# Patient Record
Sex: MALE | Race: ASIAN | NOT HISPANIC OR LATINO | URBAN - METROPOLITAN AREA
[De-identification: names, ages, dates, MRNs, and addresses within clinical notes are randomized per-mention and may not be internally consistent; named-entity substitution may affect disease eponyms.]

---

## 2017-03-03 ENCOUNTER — INPATIENT (INPATIENT)
Facility: HOSPITAL | Age: 62
LOS: 2 days | Discharge: HOME | End: 2017-03-06
Attending: INTERNAL MEDICINE | Admitting: INTERNAL MEDICINE
Payer: COMMERCIAL

## 2017-03-03 VITALS
DIASTOLIC BLOOD PRESSURE: 88 MMHG | HEIGHT: 67 IN | TEMPERATURE: 98 F | HEART RATE: 55 BPM | SYSTOLIC BLOOD PRESSURE: 162 MMHG | OXYGEN SATURATION: 99 % | WEIGHT: 199.96 LBS | RESPIRATION RATE: 20 BRPM

## 2017-03-03 DIAGNOSIS — R06.02 SHORTNESS OF BREATH: ICD-10-CM

## 2017-03-03 DIAGNOSIS — D16.9 BENIGN NEOPLASM OF BONE AND ARTICULAR CARTILAGE, UNSPECIFIED: Chronic | ICD-10-CM

## 2017-03-03 DIAGNOSIS — J18.9 PNEUMONIA, UNSPECIFIED ORGANISM: ICD-10-CM

## 2017-03-03 LAB
ALBUMIN SERPL ELPH-MCNC: 3.1 G/DL — LOW (ref 3.3–5)
ALP SERPL-CCNC: 63 U/L — SIGNIFICANT CHANGE UP (ref 40–120)
ALT FLD-CCNC: 46 U/L — SIGNIFICANT CHANGE UP (ref 12–78)
ANION GAP SERPL CALC-SCNC: 10 MMOL/L — SIGNIFICANT CHANGE UP (ref 5–17)
AST SERPL-CCNC: 42 U/L — HIGH (ref 15–37)
BASE EXCESS BLDV CALC-SCNC: 1.3 MMOL/L — SIGNIFICANT CHANGE UP (ref -2–2)
BASOPHILS # BLD AUTO: 0.1 K/UL — SIGNIFICANT CHANGE UP (ref 0–0.2)
BASOPHILS NFR BLD AUTO: 1.3 % — SIGNIFICANT CHANGE UP (ref 0–2)
BILIRUB SERPL-MCNC: 0.7 MG/DL — SIGNIFICANT CHANGE UP (ref 0.2–1.2)
BLOOD GAS COMMENTS, VENOUS: SIGNIFICANT CHANGE UP
BUN SERPL-MCNC: 25 MG/DL — HIGH (ref 7–23)
CALCIUM SERPL-MCNC: 7.6 MG/DL — LOW (ref 8.5–10.1)
CHLORIDE SERPL-SCNC: 104 MMOL/L — SIGNIFICANT CHANGE UP (ref 96–108)
CO2 SERPL-SCNC: 28 MMOL/L — SIGNIFICANT CHANGE UP (ref 22–31)
CREAT SERPL-MCNC: 1.41 MG/DL — HIGH (ref 0.5–1.3)
EOSINOPHIL # BLD AUTO: 0.1 K/UL — SIGNIFICANT CHANGE UP (ref 0–0.5)
EOSINOPHIL NFR BLD AUTO: 1.2 % — SIGNIFICANT CHANGE UP (ref 0–6)
FLUAV SPEC QL CULT: NEGATIVE — SIGNIFICANT CHANGE UP
FLUBV AG SPEC QL IA: NEGATIVE — SIGNIFICANT CHANGE UP
GAS PNL BLDV: SIGNIFICANT CHANGE UP
GLUCOSE SERPL-MCNC: 114 MG/DL — HIGH (ref 70–99)
HCO3 BLDV-SCNC: 25 MMOL/L — SIGNIFICANT CHANGE UP (ref 21–29)
HCT VFR BLD CALC: 42.4 % — SIGNIFICANT CHANGE UP (ref 39–50)
HGB BLD-MCNC: 14.4 G/DL — SIGNIFICANT CHANGE UP (ref 13–17)
HOROWITZ INDEX BLDV+IHG-RTO: 28 — SIGNIFICANT CHANGE UP
LYMPHOCYTES # BLD AUTO: 0.8 K/UL — LOW (ref 1–3.3)
LYMPHOCYTES # BLD AUTO: 9.5 % — LOW (ref 13–44)
MCHC RBC-ENTMCNC: 31.4 PG — SIGNIFICANT CHANGE UP (ref 27–34)
MCHC RBC-ENTMCNC: 33.9 GM/DL — SIGNIFICANT CHANGE UP (ref 32–36)
MCV RBC AUTO: 92.5 FL — SIGNIFICANT CHANGE UP (ref 80–100)
MONOCYTES # BLD AUTO: 0.7 K/UL — SIGNIFICANT CHANGE UP (ref 0–0.9)
MONOCYTES NFR BLD AUTO: 7.9 % — SIGNIFICANT CHANGE UP (ref 2–14)
NEUTROPHILS # BLD AUTO: 7.2 K/UL — SIGNIFICANT CHANGE UP (ref 1.8–7.4)
NEUTROPHILS NFR BLD AUTO: 80.2 % — HIGH (ref 43–77)
NT-PROBNP SERPL-SCNC: HIGH PG/ML (ref 0–125)
PCO2 BLDV: 39 MMHG — SIGNIFICANT CHANGE UP (ref 35–50)
PH BLDV: 7.42 — SIGNIFICANT CHANGE UP (ref 7.35–7.45)
PLATELET # BLD AUTO: 250 K/UL — SIGNIFICANT CHANGE UP (ref 150–400)
PO2 BLDV: 121 MMHG — HIGH (ref 25–45)
POTASSIUM SERPL-MCNC: 3.8 MMOL/L — SIGNIFICANT CHANGE UP (ref 3.5–5.3)
POTASSIUM SERPL-SCNC: 3.8 MMOL/L — SIGNIFICANT CHANGE UP (ref 3.5–5.3)
PROT SERPL-MCNC: 6.4 GM/DL — SIGNIFICANT CHANGE UP (ref 6–8.3)
RBC # BLD: 4.58 M/UL — SIGNIFICANT CHANGE UP (ref 4.2–5.8)
RBC # FLD: 13.8 % — SIGNIFICANT CHANGE UP (ref 11–15)
SAO2 % BLDV: 99 % — HIGH (ref 67–88)
SODIUM SERPL-SCNC: 142 MMOL/L — SIGNIFICANT CHANGE UP (ref 135–145)
TROPONIN I SERPL-MCNC: 0.12 NG/ML — HIGH (ref 0.01–0.04)
WBC # BLD: 9 K/UL — SIGNIFICANT CHANGE UP (ref 3.8–10.5)
WBC # FLD AUTO: 9 K/UL — SIGNIFICANT CHANGE UP (ref 3.8–10.5)

## 2017-03-03 PROCEDURE — 71010: CPT | Mod: 26

## 2017-03-03 PROCEDURE — 99283 EMERGENCY DEPT VISIT LOW MDM: CPT

## 2017-03-03 RX ORDER — FUROSEMIDE 40 MG
40 TABLET ORAL ONCE
Qty: 0 | Refills: 0 | Status: COMPLETED | OUTPATIENT
Start: 2017-03-03 | End: 2017-03-03

## 2017-03-03 RX ORDER — ASPIRIN/CALCIUM CARB/MAGNESIUM 324 MG
325 TABLET ORAL ONCE
Qty: 0 | Refills: 0 | Status: COMPLETED | OUTPATIENT
Start: 2017-03-03 | End: 2017-03-03

## 2017-03-03 RX ORDER — MAGNESIUM SULFATE 500 MG/ML
2 VIAL (ML) INJECTION ONCE
Qty: 0 | Refills: 0 | Status: COMPLETED | OUTPATIENT
Start: 2017-03-03 | End: 2017-03-03

## 2017-03-03 RX ORDER — SODIUM CHLORIDE 9 MG/ML
1000 INJECTION, SOLUTION INTRAVENOUS
Qty: 0 | Refills: 0 | Status: DISCONTINUED | OUTPATIENT
Start: 2017-03-03 | End: 2017-03-05

## 2017-03-03 RX ORDER — ENOXAPARIN SODIUM 100 MG/ML
40 INJECTION SUBCUTANEOUS EVERY 24 HOURS
Qty: 0 | Refills: 0 | Status: DISCONTINUED | OUTPATIENT
Start: 2017-03-03 | End: 2017-03-06

## 2017-03-03 RX ORDER — FUROSEMIDE 40 MG
40 TABLET ORAL DAILY
Qty: 0 | Refills: 0 | Status: DISCONTINUED | OUTPATIENT
Start: 2017-03-03 | End: 2017-03-06

## 2017-03-03 RX ORDER — IPRATROPIUM/ALBUTEROL SULFATE 18-103MCG
3 AEROSOL WITH ADAPTER (GRAM) INHALATION
Qty: 0 | Refills: 0 | Status: COMPLETED | OUTPATIENT
Start: 2017-03-03 | End: 2017-03-03

## 2017-03-03 RX ORDER — SPIRONOLACTONE 25 MG/1
25 TABLET, FILM COATED ORAL DAILY
Qty: 0 | Refills: 0 | Status: DISCONTINUED | OUTPATIENT
Start: 2017-03-03 | End: 2017-03-06

## 2017-03-03 RX ORDER — ALBUTEROL 90 UG/1
2.5 AEROSOL, METERED ORAL ONCE
Qty: 0 | Refills: 0 | Status: COMPLETED | OUTPATIENT
Start: 2017-03-03 | End: 2017-03-03

## 2017-03-03 RX ADMIN — Medication 3 MILLILITER(S): at 14:56

## 2017-03-03 RX ADMIN — Medication 3 MILLILITER(S): at 15:02

## 2017-03-03 RX ADMIN — ALBUTEROL 2.5 MILLIGRAM(S): 90 AEROSOL, METERED ORAL at 17:24

## 2017-03-03 RX ADMIN — Medication 3 MILLILITER(S): at 15:13

## 2017-03-03 RX ADMIN — Medication 100 GRAM(S): at 15:10

## 2017-03-03 RX ADMIN — Medication 325 MILLIGRAM(S): at 16:23

## 2017-03-03 RX ADMIN — Medication 40 MILLIGRAM(S): at 17:21

## 2017-03-03 NOTE — ED ADULT NURSE NOTE - OBJECTIVE STATEMENT
Patient complaining of shortness of breath for the past week and states he has been "dragging." Bilateral legs noted to be swollen at this time.

## 2017-03-03 NOTE — CONSULT NOTE ADULT - SUBJECTIVE AND OBJECTIVE BOX
HPI:  62 y/o M w hx of htn, asthma, CHF w EF 40%, presents w shortness of breath and chest tightness x 1 week; reports cough productive of yellow/bebe sputum; denies fever or chills; denies chest pain; denies lower ext swelling. on diuretics and has'nt has them for about one week      PAST MEDICAL & SURGICAL HISTORY:  chf with dilated left ventricle and  EF of 45%. also hx of asthma-like symptoms. s/p cva    FAMILY HISTORY:    SOCIAL HISTORY: BMI (kg/m2): 31.3 (03-03 @ 14:36)    Allergies  No Known Allergies          MEDICATIONS  (STANDING):  enoxaparin Injectable 40milliGRAM(s) SubCutaneous every 24 hours  sodium chloride 0.45%. 1000milliLiter(s) IV Continuous <Continuous>  levoFLOXacin IVPB 500milliGRAM(s) IV Intermittent every 24 hours  enalapril 5milliGRAM(s) Oral two times a day  furosemide    Tablet 40milliGRAM(s) Oral daily  spironolactone 25milliGRAM(s) Oral daily    MEDICATIONS  (PRN):    REVIEW OF SYSTEMS:    Constitutional:            No fever, weight loss or fatigue  HEENT:                         No difficulty hearing, tinnitus, vertigo; No sinus or throat pain  Respiratory:               cough, sob, wheezing  Cardiovascular:           No chest pain, palpitations  Gastrointestinal:        No abdominal or epigastric pain. No N/V/diarrhea or hematemesis  Genitourinary:            No dysuria, frequency, hematuria or incontinence  SKIN:                             no rash  Musculoskeletal:        No joint pain or swelling  Extremities:                occ. lower leg edema  Neurological:              No headaches  Psychiatric:                 No depression, anxiety    PQRS:  Vaccines - Influenza and Pneumovax:  BMI:  Tobacco:  Depression:   Colorectal Screening:  Breast Cancer Screening:  Blood Presssure Screening / Control of:  HbAIc:  Ischemic Vascular Disease:  Current Medications Reviewed:    Vital Signs Last 24 Hrs  T(C): 37, Max: 37 (03-03 @ 17:19)  T(F): 98.6, Max: 98.6 (03-03 @ 17:19)  HR: 109 (55 - 109)  BP: 172/83 (162/88 - 172/83)  BP(mean): --  RR: 24 (20 - 24)  SpO2: 98% (98% - 99%)    PHYSICAL EXAM:  GEN:         Awake, responsive and comfortable.  HEENT:    Normal.    RESP:     diminished breath sounds with rhonchi  CVS:             Regular rate and rhythm.   ABD:         Soft, non-tender, non-distended;   :             No costovertebral angle tenderness  SKIN:           Warm and dry.  EXTR:            mild lower ext. edema  CNS:              Intact sensory and motor function.  PSYCH:        cooperative, no anxiety or depression            LABS:                        14.4   9.0   )-----------( 250      ( 03 Mar 2017 15:22 )             42.4     03 Mar 2017 15:22    142    |  104    |  25     ----------------------------<  114    3.8     |  28     |  1.41     Ca    7.6        03 Mar 2017 15:22    TPro  6.4    /  Alb  3.1    /  TBili  0.7    /  DBili  x      /  AST  42     /  ALT  46     /  AlkPhos  63     03 Mar 2017 15:22                EKG:     RADIOLOGY & ADDITIONAL STUDIES:  PROCEDURE DATE:  03/03/2017        INTERPRETATION:  EXAMINATION DATE: March 3, 2017 at 1636 hours.  CLINICAL INFORMATION: Shortness of breath.    TECHNIQUE: Frontal view of the chest   COMPARISON:None.    FINDINGS:    LUNGS/PLEURA: Small left pleural effusion with basilar atelectasis or   pneumonia.  MEDIASTINUM: Cardiac silhouette is enlarged.  OTHER: None.    IMPRESSION:     Small left pleural effusion with basilar atelectasis or pneumonia.              YASHIRA IVY M.D., ATTENDING RADIOLOGIST  This document has been electronically signed. Mar  3 2017  5:03PM  ASSESSMENT AND PLAN: dilated cardiomyopathy, chf, obstructive airway disease, left pleural effusion and/or pneumonia. to continue diuretics, iv abs, and bronchdilators. steroids not required
HPI:  HPI:  62 y/o M w hx of htn, asthma, CHF w EF 40%, presents w shortness of breath and chest tightness x 1 week; reports cough productive of yellow/bebe sputum; denies fever or chills; denies chest pain; denies lower ext swelling; reports symptoms are c/w asthma exacerbation; (03 Mar 2017 19:51)      Chief Complaint:  Patient is a 61y old  Male who presents with a chief complaint of cough (03 Mar 2017 19:51)      Review of Systems:      ENT:  No sore throat, pain, runny nose, dysphagia  CV:  No pain, palpitations, hypo/hypertension  Resp:  SOB  GI:  No pain, nausea, vomiting, diarrhea, constipation  :  No pain, bleeding, incontinence, nocturia        Social History/Family History  SOCHX:   tobacco,  -  alcohol    FMHX: FA/MO  - contributory       Discussed with:  PMD, Family    Physical Exam:    Vital Signs:  Vital Signs Last 24 Hrs  T(C): 37.1, Max: 37.1 (03-04 @ 05:03)  T(F): 98.8, Max: 98.8 (03-04 @ 05:03)  HR: 94 (55 - 120)  BP: 133/85 (133/85 - 172/83)  BP(mean): --  RR: 18 (16 - 24)  SpO2: 98% (97% - 99%)  Daily Height in cm: 170.18 (03 Mar 2017 23:15)    Daily   I&O's Summary    I & Os for current day (as of 04 Mar 2017 10:01)  =============================================  IN: 0 ml / OUT: 600 ml / NET: -600 ml      Chest:  Full & symmetric excursion, no increased effort, breath sounds clear  Cardiovascular:  Regular rhythm, S1, S2, no murmur/rub/S3/S4, no carotid/femoral/abdominal bruit, radial/pedal pulses 2+, no edema  Abdomen:  Soft, non-tender, non-distended, normoactive bowel sounds, no HSM        Laboratory:                          14.2   8.8   )-----------( 226      ( 04 Mar 2017 06:51 )             40.6     04 Mar 2017 06:51    139    |  102    |  26     ----------------------------<  110    3.8     |  29     |  1.19     Ca    7.9        04 Mar 2017 06:51    TPro  6.4    /  Alb  3.1    /  TBili  0.7    /  DBili  x      /  AST  42     /  ALT  46     /  AlkPhos  63     03 Mar 2017 15:22      CARDIAC MARKERS ( 03 Mar 2017 15:22 )  .120 ng/mL / x     / x     / x     / x          CAPILLARY BLOOD GLUCOSE    LIVER FUNCTIONS - ( 03 Mar 2017 15:22 )  Alb: 3.1 g/dL / Pro: 6.4 gm/dL / ALK PHOS: 63 U/L / ALT: 46 U/L / AST: 42 U/L / GGT: x             Assessment:  SOB  H/O CHF, EF noted, acute on chronic systolic CHF  CAP  ABX initiated.   CHF management continues

## 2017-03-03 NOTE — ED PROVIDER NOTE - PHYSICAL EXAMINATION
Gen: well appearing, no respiratory distress  HEENT: Gen: well appearing, no respiratory distress  HEENT: no pallor;

## 2017-03-03 NOTE — ED PROVIDER NOTE - OBJECTIVE STATEMENT
Pertinent PMH/PSH/FHx/SHx and Review of Systems contained within:    60 y/o M w hx of htn, asthma, CHF w EF 40%, presents w shortness of breath and chest tightness x 1 week; reports cough productive of yellow/bebe sputum; denies fever or chills; denies chest pain; denies lower ext swelling; reports symptoms are c/w asthma exacerbation; ROS is otherwise negative  of note, patient received albuterol/atrovent and 125mg solumedrol via EMS prior to ED arrival    PMH: as above  meds: albuterol; spironolactone; furosemide; enalapril  allergies: nkda  SH: denies cig or drug use    EKG: RBBB TWI v1-v6

## 2017-03-03 NOTE — ED PROVIDER NOTE - MEDICAL DECISION MAKING DETAILS
A/P: 60 y/o M w hx of asthma, presents w progressive cough and chest tightness; also appears fluid overloaded on exam; plan for labs, ekg, CXR, and will likely need admission A/P: 60 y/o M w hx of asthma, presents w progressive cough and chest tightness; also appears fluid overloaded on exam; plan for labs, ekg, CXR, and will likely need admission  trop 0.120, BNP 10,000; will dose w asa/lasix; no chest pain; cardiology (Dr. Schumacher, awaiting call back); cxr significant for pna, will treat for CAP w levofloxacin; admit to Dr. Flores, pulmonary paged (Dr. Ramires)

## 2017-03-03 NOTE — H&P ADULT. - ASSESSMENT
60 y/o M w hx of htn, asthma, CHF w EF 40%, presents w shortness of breath and chest tightness x 1 week; reports cough productive of yellow/bebe sputum; denies fever or chills; denies chest pain; denies lower ext swelling; reports symptoms are c/w asthma exacerbation;

## 2017-03-04 LAB
ANION GAP SERPL CALC-SCNC: 8 MMOL/L — SIGNIFICANT CHANGE UP (ref 5–17)
BUN SERPL-MCNC: 26 MG/DL — HIGH (ref 7–23)
CALCIUM SERPL-MCNC: 7.9 MG/DL — LOW (ref 8.5–10.1)
CHLORIDE SERPL-SCNC: 102 MMOL/L — SIGNIFICANT CHANGE UP (ref 96–108)
CO2 SERPL-SCNC: 29 MMOL/L — SIGNIFICANT CHANGE UP (ref 22–31)
CREAT SERPL-MCNC: 1.19 MG/DL — SIGNIFICANT CHANGE UP (ref 0.5–1.3)
GLUCOSE SERPL-MCNC: 110 MG/DL — HIGH (ref 70–99)
HCT VFR BLD CALC: 40.6 % — SIGNIFICANT CHANGE UP (ref 39–50)
HGB BLD-MCNC: 14.2 G/DL — SIGNIFICANT CHANGE UP (ref 13–17)
MCHC RBC-ENTMCNC: 32.3 PG — SIGNIFICANT CHANGE UP (ref 27–34)
MCHC RBC-ENTMCNC: 34.8 GM/DL — SIGNIFICANT CHANGE UP (ref 32–36)
MCV RBC AUTO: 92.7 FL — SIGNIFICANT CHANGE UP (ref 80–100)
PLATELET # BLD AUTO: 226 K/UL — SIGNIFICANT CHANGE UP (ref 150–400)
POTASSIUM SERPL-MCNC: 3.8 MMOL/L — SIGNIFICANT CHANGE UP (ref 3.5–5.3)
POTASSIUM SERPL-SCNC: 3.8 MMOL/L — SIGNIFICANT CHANGE UP (ref 3.5–5.3)
RBC # BLD: 4.38 M/UL — SIGNIFICANT CHANGE UP (ref 4.2–5.8)
RBC # FLD: 13.6 % — SIGNIFICANT CHANGE UP (ref 11–15)
SODIUM SERPL-SCNC: 139 MMOL/L — SIGNIFICANT CHANGE UP (ref 135–145)
WBC # BLD: 8.8 K/UL — SIGNIFICANT CHANGE UP (ref 3.8–10.5)
WBC # FLD AUTO: 8.8 K/UL — SIGNIFICANT CHANGE UP (ref 3.8–10.5)

## 2017-03-04 RX ORDER — ALBUTEROL 90 UG/1
1 AEROSOL, METERED ORAL EVERY 4 HOURS
Qty: 0 | Refills: 0 | Status: DISCONTINUED | OUTPATIENT
Start: 2017-03-04 | End: 2017-03-06

## 2017-03-04 RX ORDER — IPRATROPIUM/ALBUTEROL SULFATE 18-103MCG
3 AEROSOL WITH ADAPTER (GRAM) INHALATION EVERY 6 HOURS
Qty: 0 | Refills: 0 | Status: DISCONTINUED | OUTPATIENT
Start: 2017-03-04 | End: 2017-03-06

## 2017-03-04 RX ORDER — INFLUENZA VIRUS VACCINE 15; 15; 15; 15 UG/.5ML; UG/.5ML; UG/.5ML; UG/.5ML
0.5 SUSPENSION INTRAMUSCULAR ONCE
Qty: 0 | Refills: 0 | Status: COMPLETED | OUTPATIENT
Start: 2017-03-04 | End: 2017-03-06

## 2017-03-04 RX ORDER — TIOTROPIUM BROMIDE 18 UG/1
1 CAPSULE ORAL; RESPIRATORY (INHALATION) DAILY
Qty: 0 | Refills: 0 | Status: DISCONTINUED | OUTPATIENT
Start: 2017-03-04 | End: 2017-03-06

## 2017-03-04 RX ADMIN — Medication 5 MILLIGRAM(S): at 05:48

## 2017-03-04 RX ADMIN — Medication 3 MILLILITER(S): at 23:54

## 2017-03-04 RX ADMIN — SODIUM CHLORIDE 70 MILLILITER(S): 9 INJECTION, SOLUTION INTRAVENOUS at 12:48

## 2017-03-04 RX ADMIN — Medication 40 MILLIGRAM(S): at 05:48

## 2017-03-04 RX ADMIN — ENOXAPARIN SODIUM 40 MILLIGRAM(S): 100 INJECTION SUBCUTANEOUS at 22:23

## 2017-03-04 RX ADMIN — ENOXAPARIN SODIUM 40 MILLIGRAM(S): 100 INJECTION SUBCUTANEOUS at 00:29

## 2017-03-04 RX ADMIN — SPIRONOLACTONE 25 MILLIGRAM(S): 25 TABLET, FILM COATED ORAL at 05:48

## 2017-03-04 RX ADMIN — SODIUM CHLORIDE 70 MILLILITER(S): 9 INJECTION, SOLUTION INTRAVENOUS at 00:39

## 2017-03-05 PROCEDURE — 71020: CPT | Mod: 26

## 2017-03-05 RX ORDER — IPRATROPIUM/ALBUTEROL SULFATE 18-103MCG
3 AEROSOL WITH ADAPTER (GRAM) INHALATION ONCE
Qty: 0 | Refills: 0 | Status: COMPLETED | OUTPATIENT
Start: 2017-03-05 | End: 2017-03-05

## 2017-03-05 RX ADMIN — SODIUM CHLORIDE 70 MILLILITER(S): 9 INJECTION, SOLUTION INTRAVENOUS at 05:46

## 2017-03-05 RX ADMIN — Medication 5 MILLIGRAM(S): at 05:46

## 2017-03-05 RX ADMIN — SPIRONOLACTONE 25 MILLIGRAM(S): 25 TABLET, FILM COATED ORAL at 05:46

## 2017-03-05 RX ADMIN — ENOXAPARIN SODIUM 40 MILLIGRAM(S): 100 INJECTION SUBCUTANEOUS at 21:21

## 2017-03-05 RX ADMIN — Medication 3 MILLILITER(S): at 00:24

## 2017-03-05 RX ADMIN — Medication 30 MILLIGRAM(S): at 21:20

## 2017-03-05 RX ADMIN — Medication 40 MILLIGRAM(S): at 05:46

## 2017-03-05 RX ADMIN — Medication 30 MILLIGRAM(S): at 05:46

## 2017-03-05 RX ADMIN — Medication 30 MILLIGRAM(S): at 15:58

## 2017-03-05 RX ADMIN — Medication 3 MILLILITER(S): at 23:25

## 2017-03-05 RX ADMIN — Medication 3 MILLILITER(S): at 06:48

## 2017-03-05 RX ADMIN — Medication 3 MILLILITER(S): at 17:12

## 2017-03-05 RX ADMIN — Medication 3 MILLILITER(S): at 11:03

## 2017-03-06 VITALS — OXYGEN SATURATION: 98 %

## 2017-03-06 LAB
ANION GAP SERPL CALC-SCNC: 10 MMOL/L — SIGNIFICANT CHANGE UP (ref 5–17)
BUN SERPL-MCNC: 23 MG/DL — SIGNIFICANT CHANGE UP (ref 7–23)
CALCIUM SERPL-MCNC: 8.2 MG/DL — LOW (ref 8.5–10.1)
CHLORIDE SERPL-SCNC: 99 MMOL/L — SIGNIFICANT CHANGE UP (ref 96–108)
CO2 SERPL-SCNC: 31 MMOL/L — SIGNIFICANT CHANGE UP (ref 22–31)
CREAT SERPL-MCNC: 1.2 MG/DL — SIGNIFICANT CHANGE UP (ref 0.5–1.3)
GLUCOSE SERPL-MCNC: 130 MG/DL — HIGH (ref 70–99)
HCT VFR BLD CALC: 42.6 % — SIGNIFICANT CHANGE UP (ref 39–50)
HGB BLD-MCNC: 14.4 G/DL — SIGNIFICANT CHANGE UP (ref 13–17)
MCHC RBC-ENTMCNC: 31.8 PG — SIGNIFICANT CHANGE UP (ref 27–34)
MCHC RBC-ENTMCNC: 33.8 GM/DL — SIGNIFICANT CHANGE UP (ref 32–36)
MCV RBC AUTO: 94 FL — SIGNIFICANT CHANGE UP (ref 80–100)
PLATELET # BLD AUTO: 290 K/UL — SIGNIFICANT CHANGE UP (ref 150–400)
POTASSIUM SERPL-MCNC: 4.3 MMOL/L — SIGNIFICANT CHANGE UP (ref 3.5–5.3)
POTASSIUM SERPL-SCNC: 4.3 MMOL/L — SIGNIFICANT CHANGE UP (ref 3.5–5.3)
RBC # BLD: 4.53 M/UL — SIGNIFICANT CHANGE UP (ref 4.2–5.8)
RBC # FLD: 14.2 % — SIGNIFICANT CHANGE UP (ref 11–15)
SODIUM SERPL-SCNC: 140 MMOL/L — SIGNIFICANT CHANGE UP (ref 135–145)
WBC # BLD: 12.1 K/UL — HIGH (ref 3.8–10.5)
WBC # FLD AUTO: 12.1 K/UL — HIGH (ref 3.8–10.5)

## 2017-03-06 RX ORDER — TIOTROPIUM BROMIDE 18 UG/1
1 CAPSULE ORAL; RESPIRATORY (INHALATION)
Qty: 1 | Refills: 0 | OUTPATIENT
Start: 2017-03-06 | End: 2017-04-05

## 2017-03-06 RX ORDER — FUROSEMIDE 40 MG
1 TABLET ORAL
Qty: 30 | Refills: 0 | OUTPATIENT
Start: 2017-03-06 | End: 2017-04-05

## 2017-03-06 RX ORDER — ALBUTEROL 90 UG/1
2 AEROSOL, METERED ORAL
Qty: 1 | Refills: 0 | OUTPATIENT
Start: 2017-03-06 | End: 2017-04-05

## 2017-03-06 RX ADMIN — Medication 5 MILLIGRAM(S): at 17:24

## 2017-03-06 RX ADMIN — Medication 3 MILLILITER(S): at 11:43

## 2017-03-06 RX ADMIN — Medication 30 MILLIGRAM(S): at 13:07

## 2017-03-06 RX ADMIN — Medication 30 MILLIGRAM(S): at 06:19

## 2017-03-06 RX ADMIN — INFLUENZA VIRUS VACCINE 0.5 MILLILITER(S): 15; 15; 15; 15 SUSPENSION INTRAMUSCULAR at 17:28

## 2017-03-06 RX ADMIN — SPIRONOLACTONE 25 MILLIGRAM(S): 25 TABLET, FILM COATED ORAL at 06:19

## 2017-03-06 RX ADMIN — Medication 3 MILLILITER(S): at 06:12

## 2017-03-06 RX ADMIN — Medication 40 MILLIGRAM(S): at 06:20

## 2017-03-06 RX ADMIN — Medication 5 MILLIGRAM(S): at 06:19

## 2017-03-06 RX ADMIN — Medication 3 MILLILITER(S): at 18:01

## 2017-03-06 NOTE — PROGRESS NOTE ADULT - SUBJECTIVE AND OBJECTIVE BOX
Assessment:  SOB  H/O CHF, EF noted, acute on chronic systolic CHF  CAP  ABX initiated.   CHF management continues  Clinical improvement
INTERVAL HPI/OVERNIGHT EVENTS:  continues to cough with wheezing    Vital Signs Last 24 Hrs  T(C): 36.9, Max: 37.1 (03-04 @ 05:03)  T(F): 98.4, Max: 98.8 (03-04 @ 05:03)  HR: 87 (87 - 103)  BP: 106/60 (106/60 - 144/82)  BP(mean): --  RR: 18 (16 - 18)  SpO2: 98% (97% - 98%)        PHYSICAL EXAM:  GEN:         Awake, responsive and comfortable.  HEENT:    Normal.    RESP:       bilat. wheezes and rhonchi  CVS:             Regular rate and rhythm.   ABD:         Soft, non-tender, non-distended;   :             No costovertebral angle tenderness  EXTR:            No clubbing, cyanosis or edema  CNS:              Intact sensory and motor function.        MEDICATIONS  (STANDING):  enoxaparin Injectable 40milliGRAM(s) SubCutaneous every 24 hours  sodium chloride 0.45%. 1000milliLiter(s) IV Continuous <Continuous>  levoFLOXacin IVPB 500milliGRAM(s) IV Intermittent every 24 hours  enalapril 5milliGRAM(s) Oral two times a day  furosemide    Tablet 40milliGRAM(s) Oral daily  spironolactone 25milliGRAM(s) Oral daily  influenza   Vaccine 0.5milliLiter(s) IntraMuscular once    MEDICATIONS  (PRN):        LABS:                        14.2   8.8   )-----------( 226      ( 04 Mar 2017 06:51 )             40.6     04 Mar 2017 06:51    139    |  102    |  26     ----------------------------<  110    3.8     |  29     |  1.19     Ca    7.9        04 Mar 2017 06:51    TPro  6.4    /  Alb  3.1    /  TBili  0.7    /  DBili  x      /  AST  42     /  ALT  46     /  AlkPhos  63     03 Mar 2017 15:22              RADIOLOGY & ADDITIONAL STUDIES:    ASSESSMENT AND PLAN: continue abs. begin solumedrol 30mg ivp,q8h. duoneb unit dose q6h. new cxr pa/lat am.
INTERVAL HPI/OVERNIGHT EVENTS:  doing well and stable for discharge today    Vital Signs Last 24 Hrs  T(C): 37, Max: 37 (03-06 @ 16:50)  T(F): 98.6, Max: 98.6 (03-06 @ 16:50)  HR: 89 (86 - 95)  BP: 137/89 (137/89 - 141/93)  BP(mean): --  RR: 17 (16 - 17)  SpO2: 98% (95% - 98%)        PHYSICAL EXAM:  GEN:         Awake, responsive and comfortable.  HEENT:    Normal.    RESP:    mostly clear  CVS:             Regular rate and rhythm.   ABD:         Soft, non-tender, non-distended;   :             No costovertebral angle tenderness  EXTR:            No clubbing, cyanosis or edema  CNS:              Intact sensory and motor function.        MEDICATIONS  (STANDING):  enoxaparin Injectable 40milliGRAM(s) SubCutaneous every 24 hours  levoFLOXacin IVPB 500milliGRAM(s) IV Intermittent every 24 hours  enalapril 5milliGRAM(s) Oral two times a day  furosemide    Tablet 40milliGRAM(s) Oral daily  spironolactone 25milliGRAM(s) Oral daily  ALBUTerol/ipratropium for Nebulization 3milliLiter(s) Nebulizer every 6 hours  ALBUTerol    90 MICROgram(s) HFA Inhaler 1Puff(s) Inhalation every 4 hours  tiotropium 18 MICROgram(s) Capsule 1Capsule(s) Inhalation daily  methylPREDNISolone sodium succinate Injectable 30milliGRAM(s) IV Push every 8 hours    MEDICATIONS  (PRN):        LABS:                        14.4   12.1  )-----------( 290      ( 06 Mar 2017 07:35 )             42.6     06 Mar 2017 07:35    140    |  99     |  23     ----------------------------<  130    4.3     |  31     |  1.20     Ca    8.2        06 Mar 2017 07:35                RADIOLOGY & ADDITIONAL STUDIES:    ASSESSMENT AND PLAN:resolving exacerbation of asthma/pneumonia. discharge plan reviewed and agree.
INTERVAL HPI/OVERNIGHT EVENTS:  responding well to addition of solumedrol. resolving cough and sob    Vital Signs Last 24 Hrs  T(C): 37.1, Max: 37.1 (03-05 @ 17:05)  T(F): 98.8, Max: 98.8 (03-05 @ 17:05)  HR: 88 (57 - 94)  BP: 118/71 (112/78 - 133/91)  BP(mean): --  RR: 17 (17 - 111)  SpO2: 98% (96% - 98%)        PHYSICAL EXAM:  GEN:         Awake, responsive and comfortable.  HEENT:    Normal.    RESP:       increased air excursion, decreased wheezes  CVS:             Regular rate and rhythm.   ABD:         Soft, non-tender, non-distended;   :             No costovertebral angle tenderness  EXTR:            No clubbing, cyanosis or edema  CNS:              Intact sensory and motor function.        MEDICATIONS  (STANDING):  enoxaparin Injectable 40milliGRAM(s) SubCutaneous every 24 hours  levoFLOXacin IVPB 500milliGRAM(s) IV Intermittent every 24 hours  enalapril 5milliGRAM(s) Oral two times a day  furosemide    Tablet 40milliGRAM(s) Oral daily  spironolactone 25milliGRAM(s) Oral daily  influenza   Vaccine 0.5milliLiter(s) IntraMuscular once  ALBUTerol/ipratropium for Nebulization 3milliLiter(s) Nebulizer every 6 hours  ALBUTerol    90 MICROgram(s) HFA Inhaler 1Puff(s) Inhalation every 4 hours  tiotropium 18 MICROgram(s) Capsule 1Capsule(s) Inhalation daily  methylPREDNISolone sodium succinate Injectable 30milliGRAM(s) IV Push every 8 hours    MEDICATIONS  (PRN):        LABS:                        14.2   8.8   )-----------( 226      ( 04 Mar 2017 06:51 )             40.6     04 Mar 2017 06:51    139    |  102    |  26     ----------------------------<  110    3.8     |  29     |  1.19     Ca    7.9        04 Mar 2017 06:51                RADIOLOGY & ADDITIONAL STUDIES:  cxr pa/lat increased aeration at left base. no focal process  ASSESSMENT AND PLAN:obstructive airway dz responding. resolving pneumonic process. would continue present care. discharge planning.
Patient is a 61y old  Male who presents with a chief complaint of cough (03 Mar 2017 19:51)      INTERVAL HPI/OVERNIGHT EVENTS: pt is doing better    MEDICATIONS  (STANDING):  enoxaparin Injectable 40milliGRAM(s) SubCutaneous every 24 hours  sodium chloride 0.45%. 1000milliLiter(s) IV Continuous <Continuous>  levoFLOXacin IVPB 500milliGRAM(s) IV Intermittent every 24 hours  enalapril 5milliGRAM(s) Oral two times a day  furosemide    Tablet 40milliGRAM(s) Oral daily  spironolactone 25milliGRAM(s) Oral daily  influenza   Vaccine 0.5milliLiter(s) IntraMuscular once    MEDICATIONS  (PRN):      Allergies    No Known Allergies    Intolerances        REVIEW OF SYSTEMS:  CONSTITUTIONAL: No fever, weight loss, or fatigue  EYES: No eye pain, visual disturbances, or discharge  ENMT:  No difficulty hearing, tinnitus, vertigo; No sinus or throat pain  NECK: No pain or stiffness  BREASTS: No pain, masses, or nipple discharge  RESPIRATORY: No cough, wheezing, chills or hemoptysis; No shortness of breath  CARDIOVASCULAR: No chest pain, palpitations, dizziness, or leg swelling  GASTROINTESTINAL: No abdominal or epigastric pain. No nausea, vomiting, or hematemesis; No diarrhea or constipation. No melena or hematochezia.  GENITOURINARY: No dysuria, frequency, hematuria, or incontinence  NEUROLOGICAL: No headaches, memory loss, loss of strength, numbness, or tremors  SKIN: No itching, burning, rashes, or lesions   LYMPH NODES: No enlarged glands  ENDOCRINE: No heat or cold intolerance; No hair loss  MUSCULOSKELETAL: No joint pain or swelling; No muscle, back, or extremity pain  PSYCHIATRIC: No depression, anxiety, mood swings, or difficulty sleeping  HEME/LYMPH: No easy bruising, or bleeding gums  ALLERGY AND IMMUNOLOGIC: No hives or eczema    Vital Signs Last 24 Hrs  T(C): 36, Max: 37.1 (03-04 @ 05:03)  T(F): 96.8, Max: 98.8 (03-04 @ 05:03)  HR: 94 (94 - 120)  BP: 113/90 (113/90 - 172/83)  BP(mean): --  RR: 17 (16 - 24)  SpO2: 98% (97% - 98%)    PHYSICAL EXAM:  GENERAL: NAD, well-groomed, well-developed  HEAD:  Atraumatic, Normocephalic  EYES: EOMI, PERRLA, conjunctiva and sclera clear  ENMT: No tonsillar erythema, exudates, or enlargement; Moist mucous membranes, Good dentition, No lesions  NECK: Supple, No JVD, Normal thyroid  NERVOUS SYSTEM:  Alert & Oriented X3, Good concentration; Motor Strength 5/5 B/L upper and lower extremities; DTRs 2+ intact and symmetric  CHEST/LUNG: Clear to percussion bilaterally; No rales, rhonchi, wheezing, or rubs  HEART: Regular rate and rhythm; No murmurs, rubs, or gallops  ABDOMEN: Soft, Nontender, Nondistended; Bowel sounds present  EXTREMITIES:  2+ Peripheral Pulses, No clubbing, cyanosis, or edema  LYMPH: No lymphadenopathy noted  SKIN: No rashes or lesions    LABS:                        14.2   8.8   )-----------( 226      ( 04 Mar 2017 06:51 )             40.6     04 Mar 2017 06:51    139    |  102    |  26     ----------------------------<  110    3.8     |  29     |  1.19     Ca    7.9        04 Mar 2017 06:51    TPro  6.4    /  Alb  3.1    /  TBili  0.7    /  DBili  x      /  AST  42     /  ALT  46     /  AlkPhos  63     03 Mar 2017 15:22        CAPILLARY BLOOD GLUCOSE    CULTURES:    HEMOGLOBIN A1C:    CHOLESTEROL:        RADIOLOGY & ADDITIONAL TESTS:
Patient is a 61y old  Male who presents with a chief complaint of cough (03 Mar 2017 19:51)      INTERVAL HPI/OVERNIGHT EVENTS: pt is doing well    MEDICATIONS  (STANDING):  enoxaparin Injectable 40milliGRAM(s) SubCutaneous every 24 hours  levoFLOXacin IVPB 500milliGRAM(s) IV Intermittent every 24 hours  enalapril 5milliGRAM(s) Oral two times a day  furosemide    Tablet 40milliGRAM(s) Oral daily  spironolactone 25milliGRAM(s) Oral daily  influenza   Vaccine 0.5milliLiter(s) IntraMuscular once  ALBUTerol/ipratropium for Nebulization 3milliLiter(s) Nebulizer every 6 hours  ALBUTerol    90 MICROgram(s) HFA Inhaler 1Puff(s) Inhalation every 4 hours  tiotropium 18 MICROgram(s) Capsule 1Capsule(s) Inhalation daily  methylPREDNISolone sodium succinate Injectable 30milliGRAM(s) IV Push every 8 hours    MEDICATIONS  (PRN):      Allergies    No Known Allergies    Intolerances        REVIEW OF SYSTEMS:  CONSTITUTIONAL: No fever, weight loss, or fatigue  EYES: No eye pain, visual disturbances, or discharge  ENMT:  No difficulty hearing, tinnitus, vertigo; No sinus or throat pain  NECK: No pain or stiffness  BREASTS: No pain, masses, or nipple discharge  RESPIRATORY: No cough, wheezing, chills or hemoptysis; No shortness of breath  CARDIOVASCULAR: No chest pain, palpitations, dizziness, or leg swelling  GASTROINTESTINAL: No abdominal or epigastric pain. No nausea, vomiting, or hematemesis; No diarrhea or constipation. No melena or hematochezia.  GENITOURINARY: No dysuria, frequency, hematuria, or incontinence  NEUROLOGICAL: No headaches, memory loss, loss of strength, numbness, or tremors  SKIN: No itching, burning, rashes, or lesions   LYMPH NODES: No enlarged glands  ENDOCRINE: No heat or cold intolerance; No hair loss  MUSCULOSKELETAL: No joint pain or swelling; No muscle, back, or extremity pain  PSYCHIATRIC: No depression, anxiety, mood swings, or difficulty sleeping  HEME/LYMPH: No easy bruising, or bleeding gums  ALLERGY AND IMMUNOLOGIC: No hives or eczema    Vital Signs Last 24 Hrs  T(C): 36.4, Max: 36.9 (03-04 @ 17:08)  T(F): 97.6, Max: 98.4 (03-04 @ 17:08)  HR: 87 (57 - 87)  BP: 123/75 (106/60 - 133/91)  BP(mean): --  RR: 111 (18 - 111)  SpO2: 96% (96% - 98%)    PHYSICAL EXAM:  GENERAL: NAD, well-groomed, well-developed  HEAD:  Atraumatic, Normocephalic  EYES: EOMI, PERRLA, conjunctiva and sclera clear  ENMT: No tonsillar erythema, exudates, or enlargement; Moist mucous membranes, Good dentition, No lesions  NECK: Supple, No JVD, Normal thyroid  NERVOUS SYSTEM:  Alert & Oriented X3, Good concentration; Motor Strength 5/5 B/L upper and lower extremities; DTRs 2+ intact and symmetric  CHEST/LUNG: Clear to percussion bilaterally; No rales, rhonchi, wheezing, or rubs  HEART: Regular rate and rhythm; No murmurs, rubs, or gallops  ABDOMEN: Soft, Nontender, Nondistended; Bowel sounds present  EXTREMITIES:  2+ Peripheral Pulses, No clubbing, cyanosis, or edema  LYMPH: No lymphadenopathy noted  SKIN: No rashes or lesions    LABS:                        14.2   8.8   )-----------( 226      ( 04 Mar 2017 06:51 )             40.6     04 Mar 2017 06:51    139    |  102    |  26     ----------------------------<  110    3.8     |  29     |  1.19     Ca    7.9        04 Mar 2017 06:51    TPro  6.4    /  Alb  3.1    /  TBili  0.7    /  DBili  x      /  AST  42     /  ALT  46     /  AlkPhos  63     03 Mar 2017 15:22        CAPILLARY BLOOD GLUCOSE    CULTURES:  Culture Results:   No growth to date. (03-04 @ 08:37)  Culture Results:   No growth to date. (03-04 @ 00:07)    HEMOGLOBIN A1C:    CHOLESTEROL:        RADIOLOGY & ADDITIONAL TESTS:

## 2017-03-06 NOTE — DISCHARGE NOTE ADULT - SECONDARY DIAGNOSIS.
Shortness of breath Acute on chronic systolic congestive heart failure Severe persistent asthma with acute exacerbation

## 2017-03-06 NOTE — DISCHARGE NOTE ADULT - MEDICATION SUMMARY - MEDICATIONS TO TAKE
I will START or STAY ON the medications listed below when I get home from the hospital:    predniSONE 10 mg oral tablet  -- 1 tab(s) by mouth once a day MDD:2 tab daily for 3 days the 1 tab daily for 3 days  -- It is very important that you take or use this exactly as directed.  Do not skip doses or discontinue unless directed by your doctor.  Obtain medical advice before taking any non-prescription drugs as some may affect the action of this medication.  Take with food or milk.    -- Indication: For Shortness of breath    spironolactone 25 mg oral tablet  --  by mouth once a day  -- Indication: For chf    enalapril 5 mg oral tablet  -- 1 tab(s) by mouth 2 times a day  -- Indication: For htn    albuterol 90 mcg/inh inhalation aerosol  -- 2 puff(s) inhaled every 4 hours  -- Indication: For asthma    tiotropium 18 mcg inhalation capsule  -- 1 cap(s) inhaled once a day  -- Indication: For asthma    furosemide 40 mg oral tablet  -- 1 tab(s) by mouth once a day  -- Indication: For CHF    levoFLOXacin 500 mg oral tablet  -- 1 by mouth once a day  -- Indication: For Pneumonia, community acquired

## 2017-03-06 NOTE — PROGRESS NOTE ADULT - PROVIDER SPECIALTY LIST ADULT
Cardiology
Cardiology
Internal Medicine
Internal Medicine
Pulmonology
Cardiology

## 2017-03-06 NOTE — DISCHARGE NOTE ADULT - NS AS DC FOLLOWUP STROKE INST
Heart Failure/Pneumonia Influenza vaccination ([facility] Pub Date: August 19, 2014)/Pneumonia/Heart Failure

## 2017-03-06 NOTE — DISCHARGE NOTE ADULT - CARE PLAN
Principal Discharge DX:	Pneumonia, community acquired  Goal:	complete abx  Instructions for follow-up, activity and diet:	follow up with pmd in 1 week  Secondary Diagnosis:	Shortness of breath  Secondary Diagnosis:	Acute on chronic systolic congestive heart failure  Secondary Diagnosis:	Severe persistent asthma with acute exacerbation

## 2017-03-08 DIAGNOSIS — I42.9 CARDIOMYOPATHY, UNSPECIFIED: ICD-10-CM

## 2017-03-08 DIAGNOSIS — J18.9 PNEUMONIA, UNSPECIFIED ORGANISM: ICD-10-CM

## 2017-03-08 DIAGNOSIS — I45.10 UNSPECIFIED RIGHT BUNDLE-BRANCH BLOCK: ICD-10-CM

## 2017-03-08 DIAGNOSIS — J45.51 SEVERE PERSISTENT ASTHMA WITH (ACUTE) EXACERBATION: ICD-10-CM

## 2017-03-08 DIAGNOSIS — I11.0 HYPERTENSIVE HEART DISEASE WITH HEART FAILURE: ICD-10-CM

## 2017-03-08 DIAGNOSIS — I50.23 ACUTE ON CHRONIC SYSTOLIC (CONGESTIVE) HEART FAILURE: ICD-10-CM

## 2017-03-08 DIAGNOSIS — Z86.73 PERSONAL HISTORY OF TRANSIENT ISCHEMIC ATTACK (TIA), AND CEREBRAL INFARCTION WITHOUT RESIDUAL DEFICITS: ICD-10-CM

## 2017-03-09 LAB
CULTURE RESULTS: SIGNIFICANT CHANGE UP
CULTURE RESULTS: SIGNIFICANT CHANGE UP
SPECIMEN SOURCE: SIGNIFICANT CHANGE UP
SPECIMEN SOURCE: SIGNIFICANT CHANGE UP

## 2017-04-14 ENCOUNTER — INPATIENT (INPATIENT)
Facility: HOSPITAL | Age: 62
LOS: 3 days | Discharge: ANOTHER HOSPITAL | End: 2017-04-18
Attending: INTERNAL MEDICINE | Admitting: INTERNAL MEDICINE
Payer: COMMERCIAL

## 2017-04-14 VITALS
SYSTOLIC BLOOD PRESSURE: 158 MMHG | HEART RATE: 82 BPM | WEIGHT: 201.06 LBS | OXYGEN SATURATION: 100 % | HEIGHT: 67 IN | DIASTOLIC BLOOD PRESSURE: 94 MMHG

## 2017-04-14 DIAGNOSIS — D16.9 BENIGN NEOPLASM OF BONE AND ARTICULAR CARTILAGE, UNSPECIFIED: Chronic | ICD-10-CM

## 2017-04-14 LAB
ALBUMIN SERPL ELPH-MCNC: 3.3 G/DL — SIGNIFICANT CHANGE UP (ref 3.3–5)
ALP SERPL-CCNC: 69 U/L — SIGNIFICANT CHANGE UP (ref 40–120)
ALT FLD-CCNC: 23 U/L — SIGNIFICANT CHANGE UP (ref 12–78)
ANION GAP SERPL CALC-SCNC: 10 MMOL/L — SIGNIFICANT CHANGE UP (ref 5–17)
APTT BLD: 31 SEC — SIGNIFICANT CHANGE UP (ref 27.5–37.4)
AST SERPL-CCNC: 21 U/L — SIGNIFICANT CHANGE UP (ref 15–37)
BASOPHILS # BLD AUTO: 0.1 K/UL — SIGNIFICANT CHANGE UP (ref 0–0.2)
BASOPHILS NFR BLD AUTO: 1.8 % — SIGNIFICANT CHANGE UP (ref 0–2)
BILIRUB SERPL-MCNC: 0.3 MG/DL — SIGNIFICANT CHANGE UP (ref 0.2–1.2)
BUN SERPL-MCNC: 20 MG/DL — SIGNIFICANT CHANGE UP (ref 7–23)
CALCIUM SERPL-MCNC: 8.5 MG/DL — SIGNIFICANT CHANGE UP (ref 8.5–10.1)
CHLORIDE SERPL-SCNC: 106 MMOL/L — SIGNIFICANT CHANGE UP (ref 96–108)
CK MB BLD-MCNC: 1.6 % — SIGNIFICANT CHANGE UP (ref 0–3.5)
CK MB CFR SERPL CALC: 3.8 NG/ML — HIGH (ref 0.5–3.6)
CK SERPL-CCNC: 231 U/L — SIGNIFICANT CHANGE UP (ref 26–308)
CO2 SERPL-SCNC: 26 MMOL/L — SIGNIFICANT CHANGE UP (ref 22–31)
CREAT SERPL-MCNC: 1.17 MG/DL — SIGNIFICANT CHANGE UP (ref 0.5–1.3)
EOSINOPHIL # BLD AUTO: 0.3 K/UL — SIGNIFICANT CHANGE UP (ref 0–0.5)
EOSINOPHIL NFR BLD AUTO: 4.1 % — SIGNIFICANT CHANGE UP (ref 0–6)
ETHANOL SERPL-MCNC: <10 MG/DL — SIGNIFICANT CHANGE UP (ref 0–10)
GLUCOSE SERPL-MCNC: 97 MG/DL — SIGNIFICANT CHANGE UP (ref 70–99)
HCT VFR BLD CALC: 42.4 % — SIGNIFICANT CHANGE UP (ref 39–50)
HGB BLD-MCNC: 14.9 G/DL — SIGNIFICANT CHANGE UP (ref 13–17)
INR BLD: 1.04 RATIO — SIGNIFICANT CHANGE UP (ref 0.88–1.16)
LYMPHOCYTES # BLD AUTO: 0.9 K/UL — LOW (ref 1–3.3)
LYMPHOCYTES # BLD AUTO: 11.7 % — LOW (ref 13–44)
MCHC RBC-ENTMCNC: 31.9 PG — SIGNIFICANT CHANGE UP (ref 27–34)
MCHC RBC-ENTMCNC: 35.1 GM/DL — SIGNIFICANT CHANGE UP (ref 32–36)
MCV RBC AUTO: 90.9 FL — SIGNIFICANT CHANGE UP (ref 80–100)
MONOCYTES # BLD AUTO: 0.6 K/UL — SIGNIFICANT CHANGE UP (ref 0–0.9)
MONOCYTES NFR BLD AUTO: 8 % — SIGNIFICANT CHANGE UP (ref 2–14)
NEUTROPHILS # BLD AUTO: 5.6 K/UL — SIGNIFICANT CHANGE UP (ref 1.8–7.4)
NEUTROPHILS NFR BLD AUTO: 74.4 % — SIGNIFICANT CHANGE UP (ref 43–77)
NT-PROBNP SERPL-SCNC: 9421 PG/ML — HIGH (ref 0–125)
PLATELET # BLD AUTO: 268 K/UL — SIGNIFICANT CHANGE UP (ref 150–400)
POTASSIUM SERPL-MCNC: 4 MMOL/L — SIGNIFICANT CHANGE UP (ref 3.5–5.3)
POTASSIUM SERPL-SCNC: 4 MMOL/L — SIGNIFICANT CHANGE UP (ref 3.5–5.3)
PROT SERPL-MCNC: 6.8 GM/DL — SIGNIFICANT CHANGE UP (ref 6–8.3)
PROTHROM AB SERPL-ACNC: 11.3 SEC — SIGNIFICANT CHANGE UP (ref 9.8–12.7)
RBC # BLD: 4.66 M/UL — SIGNIFICANT CHANGE UP (ref 4.2–5.8)
RBC # FLD: 13.5 % — SIGNIFICANT CHANGE UP (ref 11–15)
SODIUM SERPL-SCNC: 142 MMOL/L — SIGNIFICANT CHANGE UP (ref 135–145)
TROPONIN I SERPL-MCNC: 0.12 NG/ML — HIGH (ref 0.01–0.04)
WBC # BLD: 7.5 K/UL — SIGNIFICANT CHANGE UP (ref 3.8–10.5)
WBC # FLD AUTO: 7.5 K/UL — SIGNIFICANT CHANGE UP (ref 3.8–10.5)

## 2017-04-14 PROCEDURE — 99285 EMERGENCY DEPT VISIT HI MDM: CPT

## 2017-04-14 PROCEDURE — 71101 X-RAY EXAM UNILAT RIBS/CHEST: CPT | Mod: 26,LT

## 2017-04-14 RX ORDER — SODIUM CHLORIDE 9 MG/ML
3 INJECTION INTRAMUSCULAR; INTRAVENOUS; SUBCUTANEOUS ONCE
Qty: 0 | Refills: 0 | Status: COMPLETED | OUTPATIENT
Start: 2017-04-14 | End: 2017-04-14

## 2017-04-14 RX ADMIN — SODIUM CHLORIDE 3 MILLILITER(S): 9 INJECTION INTRAMUSCULAR; INTRAVENOUS; SUBCUTANEOUS at 21:48

## 2017-04-14 NOTE — ED ADULT NURSE NOTE - OBJECTIVE STATEMENT
Patient co cough x 2 days. Clear phlegm on the first day, yellow phlegm today. States that he was coughing too hard and heard a crack by the left rib. Denies chest pain, SOB. Left rib pain only when coughing.

## 2017-04-14 NOTE — ED PROVIDER NOTE - PHYSICAL EXAMINATION
Gen: Alert, NAD  Head: NC, AT, PERRL, EOMI, normal lids/conjunctiva  ENT: B TM WNL, normal hearing, patent oropharynx without erythema/exudate, uvula midline  Neck: +supple, no tenderness/meningismus/JVD, +Trachea midline  Pulm: Bilateral BS, exp wheezing  CV: RRR, no M/R/G, +dist pulses  Abd: soft, NT/ND, +BS, no hepatosplenomegaly  Mskel: no edema/erythema/cyanosis  Skin: no rash  Neuro: AAOx3, no sensory/motor deficits, CN 2-12 intact Gen: Alert, moderate resp distress  Head: NC, AT, PERRL, EOMI, normal lids/conjunctiva  Neck: +supple, no tenderness/meningismus/JVD, +Trachea midline  Pulm: Bilateral BS, exp wheezing, bilateral basal crackles  CV: RRR, no M/R/G, +dist pulses  Abd: soft, NT/ND, +BS, no hepatosplenomegaly  Mskel: no edema/erythema/cyanosis  Skin: no rash  Neuro: AAOx3, no sensory/motor deficits, CN 2-12 intact

## 2017-04-14 NOTE — ED PROVIDER NOTE - OBJECTIVE STATEMENT
61yoM; with pmh signif for CHF (EF = 40%), HTN, HLD; now p/w chest pain. 61yoM; with pmh signif for CHF (EF = 40%), HTN, HLD; now p/w chest pain. patient states he has been coughing for 3 days, progressively worsening and coughed, felt pinching pain in left lower chest wall. feels like something cracked. c/o sob. denies f/c/s. denies sob.  denies f/c/s. denies sick contacts. denies travel. denies abd pain. denies n/v/d.

## 2017-04-15 DIAGNOSIS — I50.23 ACUTE ON CHRONIC SYSTOLIC (CONGESTIVE) HEART FAILURE: ICD-10-CM

## 2017-04-15 DIAGNOSIS — I50.9 HEART FAILURE, UNSPECIFIED: ICD-10-CM

## 2017-04-15 DIAGNOSIS — I10 ESSENTIAL (PRIMARY) HYPERTENSION: ICD-10-CM

## 2017-04-15 LAB
CK MB CFR SERPL CALC: 3.3 NG/ML — SIGNIFICANT CHANGE UP (ref 0.5–3.6)
NT-PROBNP SERPL-SCNC: 6098 PG/ML — HIGH (ref 0–125)
TROPONIN I SERPL-MCNC: 0.08 NG/ML — HIGH (ref 0.01–0.04)

## 2017-04-15 PROCEDURE — 71275 CT ANGIOGRAPHY CHEST: CPT | Mod: 26

## 2017-04-15 RX ORDER — ALBUTEROL 90 UG/1
1 AEROSOL, METERED ORAL EVERY 6 HOURS
Qty: 0 | Refills: 0 | Status: DISCONTINUED | OUTPATIENT
Start: 2017-04-15 | End: 2017-04-18

## 2017-04-15 RX ORDER — FUROSEMIDE 40 MG
40 TABLET ORAL ONCE
Qty: 0 | Refills: 0 | Status: COMPLETED | OUTPATIENT
Start: 2017-04-15 | End: 2017-04-15

## 2017-04-15 RX ORDER — FUROSEMIDE 40 MG
40 TABLET ORAL DAILY
Qty: 0 | Refills: 0 | Status: DISCONTINUED | OUTPATIENT
Start: 2017-04-15 | End: 2017-04-18

## 2017-04-15 RX ORDER — IPRATROPIUM/ALBUTEROL SULFATE 18-103MCG
3 AEROSOL WITH ADAPTER (GRAM) INHALATION EVERY 6 HOURS
Qty: 0 | Refills: 0 | Status: DISCONTINUED | OUTPATIENT
Start: 2017-04-15 | End: 2017-04-18

## 2017-04-15 RX ORDER — ASPIRIN/CALCIUM CARB/MAGNESIUM 324 MG
325 TABLET ORAL ONCE
Qty: 0 | Refills: 0 | Status: COMPLETED | OUTPATIENT
Start: 2017-04-15 | End: 2017-04-15

## 2017-04-15 RX ORDER — TIOTROPIUM BROMIDE 18 UG/1
1 CAPSULE ORAL; RESPIRATORY (INHALATION) DAILY
Qty: 0 | Refills: 0 | Status: DISCONTINUED | OUTPATIENT
Start: 2017-04-15 | End: 2017-04-18

## 2017-04-15 RX ORDER — HEPARIN SODIUM 5000 [USP'U]/ML
INJECTION INTRAVENOUS; SUBCUTANEOUS
Qty: 25000 | Refills: 0 | Status: DISCONTINUED | OUTPATIENT
Start: 2017-04-15 | End: 2017-04-18

## 2017-04-15 RX ORDER — ENOXAPARIN SODIUM 100 MG/ML
40 INJECTION SUBCUTANEOUS EVERY 24 HOURS
Qty: 0 | Refills: 0 | Status: DISCONTINUED | OUTPATIENT
Start: 2017-04-15 | End: 2017-04-18

## 2017-04-15 RX ADMIN — Medication 5 MILLIGRAM(S): at 17:07

## 2017-04-15 RX ADMIN — Medication 40 MILLIGRAM(S): at 01:37

## 2017-04-15 RX ADMIN — HEPARIN SODIUM 1000 UNIT(S)/HR: 5000 INJECTION INTRAVENOUS; SUBCUTANEOUS at 17:28

## 2017-04-15 RX ADMIN — Medication 40 MILLIGRAM(S): at 12:01

## 2017-04-15 RX ADMIN — ENOXAPARIN SODIUM 40 MILLIGRAM(S): 100 INJECTION SUBCUTANEOUS at 11:59

## 2017-04-15 RX ADMIN — Medication 325 MILLIGRAM(S): at 05:34

## 2017-04-15 NOTE — PATIENT PROFILE ADULT. - TEACHING/LEARNING LEARNING PREFERENCES
verbal instruction/video/written material/group instruction/individual instruction/audio/computer/internet

## 2017-04-15 NOTE — ED ADULT NURSE REASSESSMENT NOTE - NS ED NURSE REASSESS COMMENT FT1
patient A&Ox3 in no acute distress no pain or disc at this time no difficulty very mild difficulty breathing , Lasix was given continue to monitor

## 2017-04-15 NOTE — ED ADULT NURSE REASSESSMENT NOTE - NS ED NURSE REASSESS COMMENT FT1
patient A&Ox3 in no acute distress denied any difficulty breathing at this time , Dr Hsieh aware patient troponin and BNP level no other orders at this time

## 2017-04-15 NOTE — H&P ADULT. - HISTORY OF PRESENT ILLNESS
61yoM; with pmh signif for CHF (EF = 40%), HTN, HLD; now p/w chest pain. patient states he has been coughing for 3 days, progressively worsening and coughed, felt pinching pain in left lower chest wall. feels like something cracked. c/o sob.

## 2017-04-15 NOTE — CONSULT NOTE ADULT - SUBJECTIVE AND OBJECTIVE BOX
Patient is a 61y old  Male who presents with a chief complaint of sob (15 Apr 2017 09:04) SOB and chest pain        PAST MEDICAL & SURGICAL HISTORY:  Asthma  Hypertension      Summary of admission HPI: SOB CHF                PREVIOUS DIAGNOSTIC TESTING:      ECHO  FINDINGS:    STRESS  FINDINGS:    CATHETERIZATION  FINDINGS:    ELECTROPHYSIOLOGY STUDY  FINDINGS:    CAROTID ULTRASOUND:  FINDINGS    VENOUS DUPLEX SCAN:  FINDINGS:    CHEST CT PULMONARY ANGIO with IV Contrast:  FINDINGS:  MEDICATIONS  (STANDING):  enoxaparin Injectable 40milliGRAM(s) SubCutaneous every 24 hours  enalapril 5milliGRAM(s) Oral two times a day  furosemide   Injectable 40milliGRAM(s) IV Push daily    MEDICATIONS  (PRN):      FAMILY HISTORY:      SOCIAL HISTORY:    CIGARETTES:    ALCOHOL:    REVIEW OF SYSTEMS:    CONSTITUTIONAL: No fever, weight loss, chills, shakes, or fatigue  EYES: No eye pain, visual disturbances, or discharge  ENMT:  No difficulty hearing, tinnitus, vertigo; No sinus or throat pain  NECK: No pain or stiffness  BREASTS: No pain, masses, or nipple discharge  RESPIRATORY: No cough, wheezing, hemoptysis, or shortness of breath  CARDIOVASCULAR: No chest pain, dyspnea, palpitations, dizziness, syncope, paroxysmal nocturnal dyspnea, orthopnea, or arm or leg swelling  GASTROINTESTINAL: No abdominal  or epigastric pain, nausea, vomiting, hematemesis, diarrhea, constipation, melena or bright red blood.  GENITOURINARY: No dysuria, nocturia, hematuria, or urinary incontinence  NEUROLOGICAL: No headaches, memory loss, slurred speech, limb weakness, loss of strength, numbness, or tremors  SKIN: No itching, burning, rashes, or lesions   LYMPH NODES: No enlarged glands  ENDOCRINE: No heat or cold intolerance, or hair loss  MUSCULOSKELETAL: No joint pain or swelling, muscle, back, or extremity pain  PSYCHIATRIC: No depression, anxiety, or difficulty sleeping  HEME/LYMPH: No easy bruising or bleeding gums  ALLERY AND IMMUNOLOGIC: No hives or rash.      Vital Signs Last 24 Hrs  T(C): 36.9, Max: 37.2 (04-14 @ 21:58)  T(F): 98.5, Max: 99 (04-14 @ 21:58)  HR: 71 (71 - 97)  BP: 132/100 (127/102 - 158/94)  BP(mean): --  RR: 18 (18 - 24)  SpO2: 98% (95% - 100%)    PHYSICAL EXAM:    GENERAL: In no apparent distress, well nourished, and hydrated.  HEAD:  Atraumatic, Normocephalic  EYES: EOMI, PERRLA, conjunctiva and sclera clear  ENMT: No tonsillar erythema, exudates, or enlargement; Moist mucous membranes, Good dentition, No lesions  NECK: Supple and normal thyroid.  No JVD or carotid bruit.  Carotid pulse is 2+ bilaterally.  HEART: Regular rate and rhythm; No murmurs, rubs, or gallops.  PULMONARY:rales  to auscultation and perfusion.  No rales, wheezing, or rhonchi bilaterally.  ABDOMEN: Soft, Nontender, Nondistended; Bowel sounds present  EXTREMITIES:  2+ Peripheral Pulses, No clubbing, cyanosis, or edema  LYMPH: No lymphadenopathy noted  NEUROLOGICAL: Grossly nonfocal          INTERPRETATION OF TELEMETRY:    ECG:    I&O's Detail  I & Os for 24h ending 15 Apr 2017 07:00  =============================================  IN:    Total IN: 0 ml  ---------------------------------------------  OUT:    Voided: 1000 ml    Total OUT: 1000 ml  ---------------------------------------------  Total NET: -1000 ml    I & Os for current day (as of 15 Apr 2017 16:41)  =============================================  IN:    Total IN: 0 ml  ---------------------------------------------  OUT:    Voided: 1000 ml    Total OUT: 1000 ml  ---------------------------------------------  Total NET: -1000 ml      LABS:                        14.9   7.5   )-----------( 268      ( 14 Apr 2017 21:42 )             42.4     04-14    142  |  106  |  20  ----------------------------<  97  4.0   |  26  |  1.17    Ca    8.5      14 Apr 2017 21:42    TPro  6.8  /  Alb  3.3  /  TBili  0.3  /  DBili  x   /  AST  21  /  ALT  23  /  AlkPhos  69  04-14    CARDIAC MARKERS ( 15 Apr 2017 11:13 )  .081 ng/mL / x     / x     / x     / 3.3 ng/mL  CARDIAC MARKERS ( 14 Apr 2017 21:42 )  .115 ng/mL / x     / 231 U/L / x     / 3.8 ng/mL      PT/INR - ( 14 Apr 2017 21:42 )   PT: 11.3 sec;   INR: 1.04 ratio         PTT - ( 14 Apr 2017 21:42 )  PTT:31.0 sec    BNPSerum Pro-Brain Natriuretic Peptide: 6098 pg/mL (04-15 @ 11:13)  Serum Pro-Brain Natriuretic Peptide: 9421 pg/mL (04-14 @ 21:42)    I&O's Detail  I & Os for 24h ending 15 Apr 2017 07:00  =============================================  IN:    Total IN: 0 ml  ---------------------------------------------  OUT:    Voided: 1000 ml    Total OUT: 1000 ml  ---------------------------------------------  Total NET: -1000 ml    I & Os for current day (as of 15 Apr 2017 16:41)  =============================================  IN:    Total IN: 0 ml  ---------------------------------------------  OUT:    Voided: 1000 ml    Total OUT: 1000 ml  ---------------------------------------------  Total NET: -1000 ml    Daily Height in cm: 170.18 (15 Apr 2017 15:59)    Daily     RADIOLOGY & ADDITIONAL STUDIES:

## 2017-04-15 NOTE — ED ADULT NURSE REASSESSMENT NOTE - NS ED NURSE REASSESS COMMENT FT1
patient in no acute distress , sleeping at this time , easy to arouse no difficulty breathing at this time

## 2017-04-15 NOTE — ED ADULT NURSE REASSESSMENT NOTE - NS ED NURSE REASSESS COMMENT FT1
patient A&Ox3 in no acute distress denied any pain or disc at this time , denied difficulty breathing at this time , give report to Ector PALOMO

## 2017-04-15 NOTE — PATIENT PROFILE ADULT. - COMFORT LEVEL, ACCEPTABLE
Subjective   Chief Complaint   Patient presents with   • Post-op     Sarithadianne Cardozo is a 33 y.o. year old  presenting to be seen for her post-operative visit.  Currently she reports no problems with eating, bowel movements, voiding, or wound drainage and pain is well controlled.    There was no pathology result associated with Saritha's recent procedure.      OTHER THINGS SHE WANTS TO DISCUSS TODAY:  Stress due to decreased milk supply    The following portions of the patient's history were reviewed and updated as appropriate:current medications, allergies and past surgical history       Objective   /68  Resp 14  Wt 154 lb (69.9 kg)  LMP 2016  Breastfeeding? Yes  BMI 26.43 kg/m2    General:  well developed; well nourished  no acute distress   Abdomen: soft, non-tender; no masses  no umbilical or inginual hernias are present  no hepato-splenomegaly  incision is healing   Pelvis: Not performed.          Assessment   1. S/P   2. Decreased milk supply     Plan   1. OK to drive  2. OK to lift  3. Nothing per vagina still until 6 weeks after her procedure  4. Follow up for her 6 week postpartum check    New Medications Ordered This Visit   Medications   • metoclopramide (REGLAN) 10 MG tablet     Sig: Take 1 tablet by mouth 4 (Four) Times a Day Before Meals & at Bedtime.     Dispense:  120 tablet     Refill:  2          This note was electronically signed.    Shan Lombardo M.D.  2017   3

## 2017-04-16 DIAGNOSIS — I10 ESSENTIAL (PRIMARY) HYPERTENSION: ICD-10-CM

## 2017-04-16 DIAGNOSIS — I21.4 NON-ST ELEVATION (NSTEMI) MYOCARDIAL INFARCTION: ICD-10-CM

## 2017-04-16 LAB
APTT BLD: 52 SEC — HIGH (ref 27.5–37.4)
APTT BLD: 54 SEC — HIGH (ref 27.5–37.4)
APTT BLD: 56.2 SEC — HIGH (ref 27.5–37.4)
HCT VFR BLD CALC: 45.7 % — SIGNIFICANT CHANGE UP (ref 39–50)
HCT VFR BLD CALC: 46 % — SIGNIFICANT CHANGE UP (ref 39–50)
HGB BLD-MCNC: 16 G/DL — SIGNIFICANT CHANGE UP (ref 13–17)
HGB BLD-MCNC: 16 G/DL — SIGNIFICANT CHANGE UP (ref 13–17)
MCHC RBC-ENTMCNC: 31.5 PG — SIGNIFICANT CHANGE UP (ref 27–34)
MCHC RBC-ENTMCNC: 31.7 PG — SIGNIFICANT CHANGE UP (ref 27–34)
MCHC RBC-ENTMCNC: 34.8 GM/DL — SIGNIFICANT CHANGE UP (ref 32–36)
MCHC RBC-ENTMCNC: 34.9 GM/DL — SIGNIFICANT CHANGE UP (ref 32–36)
MCV RBC AUTO: 90.6 FL — SIGNIFICANT CHANGE UP (ref 80–100)
MCV RBC AUTO: 90.8 FL — SIGNIFICANT CHANGE UP (ref 80–100)
PLATELET # BLD AUTO: 282 K/UL — SIGNIFICANT CHANGE UP (ref 150–400)
PLATELET # BLD AUTO: 283 K/UL — SIGNIFICANT CHANGE UP (ref 150–400)
RBC # BLD: 5.04 M/UL — SIGNIFICANT CHANGE UP (ref 4.2–5.8)
RBC # BLD: 5.08 M/UL — SIGNIFICANT CHANGE UP (ref 4.2–5.8)
RBC # FLD: 13 % — SIGNIFICANT CHANGE UP (ref 11–15)
RBC # FLD: 13.4 % — SIGNIFICANT CHANGE UP (ref 11–15)
WBC # BLD: 8.3 K/UL — SIGNIFICANT CHANGE UP (ref 3.8–10.5)
WBC # BLD: 8.5 K/UL — SIGNIFICANT CHANGE UP (ref 3.8–10.5)
WBC # FLD AUTO: 8.3 K/UL — SIGNIFICANT CHANGE UP (ref 3.8–10.5)
WBC # FLD AUTO: 8.5 K/UL — SIGNIFICANT CHANGE UP (ref 3.8–10.5)

## 2017-04-16 RX ADMIN — Medication 200 MILLIGRAM(S): at 00:44

## 2017-04-16 RX ADMIN — HEPARIN SODIUM 1200 UNIT(S)/HR: 5000 INJECTION INTRAVENOUS; SUBCUTANEOUS at 09:01

## 2017-04-16 RX ADMIN — HEPARIN SODIUM 1200 UNIT(S)/HR: 5000 INJECTION INTRAVENOUS; SUBCUTANEOUS at 00:47

## 2017-04-16 RX ADMIN — Medication 5 MILLIGRAM(S): at 05:50

## 2017-04-16 RX ADMIN — Medication 200 MILLIGRAM(S): at 11:19

## 2017-04-16 RX ADMIN — Medication 5 MILLIGRAM(S): at 17:06

## 2017-04-16 RX ADMIN — ENOXAPARIN SODIUM 40 MILLIGRAM(S): 100 INJECTION SUBCUTANEOUS at 11:19

## 2017-04-16 RX ADMIN — Medication 40 MILLIGRAM(S): at 05:50

## 2017-04-16 RX ADMIN — HEPARIN SODIUM 1200 UNIT(S)/HR: 5000 INJECTION INTRAVENOUS; SUBCUTANEOUS at 16:07

## 2017-04-16 RX ADMIN — Medication 3 MILLILITER(S): at 17:15

## 2017-04-16 RX ADMIN — Medication 3 MILLILITER(S): at 05:33

## 2017-04-16 RX ADMIN — Medication 200 MILLIGRAM(S): at 17:06

## 2017-04-16 RX ADMIN — Medication 3 MILLILITER(S): at 11:44

## 2017-04-16 NOTE — PHYSICAL THERAPY INITIAL EVALUATION ADULT - MODIFIED CLINICAL TEST OF SENSORY INTEGRATION IN BALANCE TEST
Barthel Index: Feeding Score _10__, Bathing Score _5__, Grooming Score __5_, Dressing Score _10__, Bowels Score _10__, Bladder Score _10__, Toilet Score _10__, Transfers Score _15__, Mobility Score _15__, Stairs Score __0_,     Total Score _90__

## 2017-04-16 NOTE — PHYSICAL THERAPY INITIAL EVALUATION ADULT - GAIT DEVIATIONS NOTED, PT EVAL
decreased weight-shifting ability/decreased santi/increased time in double stance/decreased step length

## 2017-04-16 NOTE — PROGRESS NOTE ADULT - SUBJECTIVE AND OBJECTIVE BOX
Patient is a 61y old  Male who presents with a chief complaint of sob (15 Apr 2017 09:04)      INTERVAL HPI/OVERNIGHT EVENTS:  pt is doing well  MEDICATIONS  (STANDING):  enoxaparin Injectable 40milliGRAM(s) SubCutaneous every 24 hours  enalapril 5milliGRAM(s) Oral two times a day  furosemide   Injectable 40milliGRAM(s) IV Push daily  heparin  Infusion. Unit(s)/Hr IV Continuous <Continuous>  ALBUTerol/ipratropium for Nebulization 3milliLiter(s) Nebulizer every 6 hours  tiotropium 18 MICROgram(s) Capsule 1Capsule(s) Inhalation daily    MEDICATIONS  (PRN):  ALBUTerol    90 MICROgram(s) HFA Inhaler 1Puff(s) Inhalation every 6 hours PRN Shortness of Breath and/or Wheezing  guaiFENesin    Syrup 200milliGRAM(s) Oral every 6 hours PRN Cough      Allergies    No Known Allergies    Intolerances        REVIEW OF SYSTEMS:  CONSTITUTIONAL: No fever, weight loss, or fatigue  EYES: No eye pain, visual disturbances, or discharge  ENMT:  No difficulty hearing, tinnitus, vertigo; No sinus or throat pain  NECK: No pain or stiffness  BREASTS: No pain, masses, or nipple discharge  RESPIRATORY: No cough, wheezing, chills or hemoptysis; No shortness of breath  CARDIOVASCULAR: No chest pain, palpitations, dizziness, or leg swelling  GASTROINTESTINAL: No abdominal or epigastric pain. No nausea, vomiting, or hematemesis; No diarrhea or constipation. No melena or hematochezia.  GENITOURINARY: No dysuria, frequency, hematuria, or incontinence  NEUROLOGICAL: No headaches, memory loss, loss of strength, numbness, or tremors  SKIN: No itching, burning, rashes, or lesions   LYMPH NODES: No enlarged glands  ENDOCRINE: No heat or cold intolerance; No hair loss  MUSCULOSKELETAL: No joint pain or swelling; No muscle, back, or extremity pain  PSYCHIATRIC: No depression, anxiety, mood swings, or difficulty sleeping  HEME/LYMPH: No easy bruising, or bleeding gums  ALLERGY AND IMMUNOLOGIC: No hives or eczema    Vital Signs Last 24 Hrs  T(C): 36.6, Max: 37 (04-16 @ 05:07)  T(F): 97.8, Max: 98.6 (04-16 @ 05:07)  HR: 84 (75 - 97)  BP: 145/100 (120/73 - 148/90)  BP(mean): --  RR: 18 (17 - 18)  SpO2: 95% (93% - 98%)    PHYSICAL EXAM:  GENERAL: NAD, well-groomed, well-developed  HEAD:  Atraumatic, Normocephalic  EYES: EOMI, PERRLA, conjunctiva and sclera clear  ENMT: No tonsillar erythema, exudates, or enlargement; Moist mucous membranes, Good dentition, No lesions  NECK: Supple, No JVD, Normal thyroid  NERVOUS SYSTEM:  Alert & Oriented X3, Good concentration; Motor Strength 5/5 B/L upper and lower extremities; DTRs 2+ intact and symmetric  CHEST/LUNG: Clear to percussion bilaterally; No rales, rhonchi, wheezing, or rubs  HEART: Regular rate and rhythm; No murmurs, rubs, or gallops  ABDOMEN: Soft, Nontender, Nondistended; Bowel sounds present  EXTREMITIES:  2+ Peripheral Pulses, No clubbing, cyanosis, or edema  LYMPH: No lymphadenopathy noted  SKIN: No rashes or lesions    LABS:                        16.0   8.3   )-----------( 282      ( 16 Apr 2017 08:30 )             46.0     04-14    142  |  106  |  20  ----------------------------<  97  4.0   |  26  |  1.17    Ca    8.5      14 Apr 2017 21:42    TPro  6.8  /  Alb  3.3  /  TBili  0.3  /  DBili  x   /  AST  21  /  ALT  23  /  AlkPhos  69  04-14    PT/INR - ( 14 Apr 2017 21:42 )   PT: 11.3 sec;   INR: 1.04 ratio         PTT - ( 16 Apr 2017 15:42 )  PTT:56.2 sec    CAPILLARY BLOOD GLUCOSE    CULTURES:    HEMOGLOBIN A1C:    CHOLESTEROL:        RADIOLOGY & ADDITIONAL TESTS:

## 2017-04-16 NOTE — PROGRESS NOTE ADULT - SUBJECTIVE AND OBJECTIVE BOX
Patient is a 61y old  Male who presents with a chief complaint of sob (15 Apr 2017 09:04) SOB         PAST MEDICAL & SURGICAL HISTORY:  Asthma  Hypertension      Summary of admission HPI:                PREVIOUS DIAGNOSTIC TESTING:      ECHO  FINDINGS:    STRESS  FINDINGS:    CATHETERIZATION  FINDINGS:    ELECTROPHYSIOLOGY STUDY  FINDINGS:    CAROTID ULTRASOUND:  FINDINGS    VENOUS DUPLEX SCAN:  FINDINGS:    CHEST CT PULMONARY ANGIO with IV Contrast:  FINDINGS:  MEDICATIONS  (STANDING):  enoxaparin Injectable 40milliGRAM(s) SubCutaneous every 24 hours  enalapril 5milliGRAM(s) Oral two times a day  furosemide   Injectable 40milliGRAM(s) IV Push daily  heparin  Infusion. Unit(s)/Hr IV Continuous <Continuous>  ALBUTerol/ipratropium for Nebulization 3milliLiter(s) Nebulizer every 6 hours  tiotropium 18 MICROgram(s) Capsule 1Capsule(s) Inhalation daily    MEDICATIONS  (PRN):  ALBUTerol    90 MICROgram(s) HFA Inhaler 1Puff(s) Inhalation every 6 hours PRN Shortness of Breath and/or Wheezing  guaiFENesin    Syrup 200milliGRAM(s) Oral every 6 hours PRN Cough      FAMILY HISTORY:      SOCIAL HISTORY:    CIGARETTES:    ALCOHOL:    REVIEW OF SYSTEMS:    CONSTITUTIONAL: No fever, weight loss, chills, shakes, or fatigue  EYES: No eye pain, visual disturbances, or discharge  ENMT:  No difficulty hearing, tinnitus, vertigo; No sinus or throat pain  NECK: No pain or stiffness  BREASTS: No pain, masses, or nipple discharge  RESPIRATORY: No cough, wheezing, hemoptysis, or shortness of breath  CARDIOVASCULAR: No chest pain, dyspnea, palpitations, dizziness, syncope, paroxysmal nocturnal dyspnea, orthopnea, or arm or leg swelling  GASTROINTESTINAL: No abdominal  or epigastric pain, nausea, vomiting, hematemesis, diarrhea, constipation, melena or bright red blood.  GENITOURINARY: No dysuria, nocturia, hematuria, or urinary incontinence  NEUROLOGICAL: No headaches, memory loss, slurred speech, limb weakness, loss of strength, numbness, or tremors  SKIN: No itching, burning, rashes, or lesions   LYMPH NODES: No enlarged glands  ENDOCRINE: No heat or cold intolerance, or hair loss  MUSCULOSKELETAL: No joint pain or swelling, muscle, back, or extremity pain  PSYCHIATRIC: No depression, anxiety, or difficulty sleeping  HEME/LYMPH: No easy bruising or bleeding gums  ALLERY AND IMMUNOLOGIC: No hives or rash.      Vital Signs Last 24 Hrs  T(C): 36.6, Max: 37 ( @ 05:07)  T(F): 97.9, Max: 98.6 ( @ 05:07)  HR: 86 (75 - 97)  BP: 140/80 (120/73 - 148/90)  BP(mean): --  RR: 18 (17 - 18)  SpO2: 94% (93% - 98%)    PHYSICAL EXAM:    GENERAL: In no apparent distress, well nourished, and hydrated.  HEAD:  Atraumatic, Normocephalic  EYES: EOMI, PERRLA, conjunctiva and sclera clear  ENMT: No tonsillar erythema, exudates, or enlargement; Moist mucous membranes, Good dentition, No lesions  NECK: Supple and normal thyroid.  No JVD or carotid bruit.  Carotid pulse is 2+ bilaterally.  HEART: Regular rate and rhythm; No murmurs, rubs, or gallops.  PULMONARY: Clear to auscultation and perfusion.  No rales, wheezing, or rhonchi bilaterally.  ABDOMEN: Soft, Nontender, Nondistended; Bowel sounds present  EXTREMITIES:  2+ Peripheral Pulses, No clubbing, cyanosis, or edema  LYMPH: No lymphadenopathy noted  NEUROLOGICAL: Grossly nonfocal          INTERPRETATION OF TELEMETRY:    ECG:    I&O's Detail  I & Os for 24h ending 2017 07:00  =============================================  IN:    Oral Fluid: 560 ml    heparin  Infusion.: 10 ml    Total IN: 570 ml  ---------------------------------------------  OUT:    Voided: 2100 ml    Total OUT: 2100 ml  ---------------------------------------------  Total NET: -1530 ml    I & Os for current day (as of 2017 16:27)  =============================================  IN:    Oral Fluid: 740 ml    heparin  Infusion.: 96 ml    Total IN: 836 ml  ---------------------------------------------  OUT:    Voided: 2000 ml    Total OUT: 2000 ml  ---------------------------------------------  Total NET: -1164 ml      LABS:                        16.0   8.3   )-----------( 282      ( 2017 08:30 )             46.0     04-14    142  |  106  |  20  ----------------------------<  97  4.0   |  26  |  1.17    Ca    8.5      2017 21:42    TPro  6.8  /  Alb  3.3  /  TBili  0.3  /  DBili  x   /  AST  21  /  ALT  23  /  AlkPhos  69  04-14    CARDIAC MARKERS ( 15 Apr 2017 11:13 )  .081 ng/mL / x     / x     / x     / 3.3 ng/mL  CARDIAC MARKERS ( 2017 21:42 )  .115 ng/mL / x     / 231 U/L / x     / 3.8 ng/mL      PT/INR - ( 2017 21:42 )   PT: 11.3 sec;   INR: 1.04 ratio         PTT - ( 2017 15:42 )  PTT:56.2 sec    BNP  I&O's Detail  I & Os for 24h ending 2017 07:00  =============================================  IN:    Oral Fluid: 560 ml    heparin  Infusion.: 10 ml    Total IN: 570 ml  ---------------------------------------------  OUT:    Voided: 2100 ml    Total OUT: 2100 ml  ---------------------------------------------  Total NET: -1530 ml    I & Os for current day (as of 2017 16:27)  =============================================  IN:    Oral Fluid: 740 ml    heparin  Infusion.: 96 ml    Total IN: 836 ml  ---------------------------------------------  OUT:    Voided: 2000 ml    Total OUT: 2000 ml  ---------------------------------------------  Total NET: -1164 ml    Daily     Daily Weight in k (2017 05:07)    RADIOLOGY & ADDITIONAL STUDIES:

## 2017-04-17 LAB
ANION GAP SERPL CALC-SCNC: 10 MMOL/L — SIGNIFICANT CHANGE UP (ref 5–17)
APPEARANCE UR: CLEAR — SIGNIFICANT CHANGE UP
APTT BLD: 45.8 SEC — HIGH (ref 27.5–37.4)
APTT BLD: 73.6 SEC — HIGH (ref 27.5–37.4)
APTT BLD: 74.2 SEC — HIGH (ref 27.5–37.4)
BILIRUB UR-MCNC: NEGATIVE — SIGNIFICANT CHANGE UP
BUN SERPL-MCNC: 21 MG/DL — SIGNIFICANT CHANGE UP (ref 7–23)
CALCIUM SERPL-MCNC: 8.6 MG/DL — SIGNIFICANT CHANGE UP (ref 8.5–10.1)
CHLORIDE SERPL-SCNC: 98 MMOL/L — SIGNIFICANT CHANGE UP (ref 96–108)
CK MB BLD-MCNC: 1.5 % — SIGNIFICANT CHANGE UP (ref 0–3.5)
CK MB CFR SERPL CALC: 2.3 NG/ML — SIGNIFICANT CHANGE UP (ref 0.5–3.6)
CK SERPL-CCNC: 156 U/L — SIGNIFICANT CHANGE UP (ref 26–308)
CO2 SERPL-SCNC: 31 MMOL/L — SIGNIFICANT CHANGE UP (ref 22–31)
COLOR SPEC: YELLOW — SIGNIFICANT CHANGE UP
CREAT SERPL-MCNC: 1.37 MG/DL — HIGH (ref 0.5–1.3)
DIFF PNL FLD: NEGATIVE — SIGNIFICANT CHANGE UP
EPI CELLS # UR: SIGNIFICANT CHANGE UP
GLUCOSE SERPL-MCNC: 89 MG/DL — SIGNIFICANT CHANGE UP (ref 70–99)
GLUCOSE UR QL: NEGATIVE MG/DL — SIGNIFICANT CHANGE UP
HCT VFR BLD CALC: 43.4 % — SIGNIFICANT CHANGE UP (ref 39–50)
HGB BLD-MCNC: 14.9 G/DL — SIGNIFICANT CHANGE UP (ref 13–17)
KETONES UR-MCNC: NEGATIVE — SIGNIFICANT CHANGE UP
LEUKOCYTE ESTERASE UR-ACNC: NEGATIVE — SIGNIFICANT CHANGE UP
MCHC RBC-ENTMCNC: 31 PG — SIGNIFICANT CHANGE UP (ref 27–34)
MCHC RBC-ENTMCNC: 34.4 GM/DL — SIGNIFICANT CHANGE UP (ref 32–36)
MCV RBC AUTO: 90.2 FL — SIGNIFICANT CHANGE UP (ref 80–100)
NITRITE UR-MCNC: NEGATIVE — SIGNIFICANT CHANGE UP
PH UR: 6.5 — SIGNIFICANT CHANGE UP (ref 4.8–8)
PLATELET # BLD AUTO: 292 K/UL — SIGNIFICANT CHANGE UP (ref 150–400)
POTASSIUM SERPL-MCNC: 3.4 MMOL/L — LOW (ref 3.5–5.3)
POTASSIUM SERPL-SCNC: 3.4 MMOL/L — LOW (ref 3.5–5.3)
PROT UR-MCNC: NEGATIVE MG/DL — SIGNIFICANT CHANGE UP
RBC # BLD: 4.81 M/UL — SIGNIFICANT CHANGE UP (ref 4.2–5.8)
RBC # FLD: 13.6 % — SIGNIFICANT CHANGE UP (ref 11–15)
RBC CASTS # UR COMP ASSIST: SIGNIFICANT CHANGE UP /HPF (ref 0–4)
SODIUM SERPL-SCNC: 139 MMOL/L — SIGNIFICANT CHANGE UP (ref 135–145)
SP GR SPEC: 1.01 — SIGNIFICANT CHANGE UP (ref 1.01–1.02)
TROPONIN I SERPL-MCNC: 0.06 NG/ML — HIGH (ref 0.01–0.04)
UROBILINOGEN FLD QL: NEGATIVE MG/DL — SIGNIFICANT CHANGE UP
WBC # BLD: 8 K/UL — SIGNIFICANT CHANGE UP (ref 3.8–10.5)
WBC # FLD AUTO: 8 K/UL — SIGNIFICANT CHANGE UP (ref 3.8–10.5)

## 2017-04-17 RX ORDER — POTASSIUM CHLORIDE 20 MEQ
40 PACKET (EA) ORAL ONCE
Qty: 0 | Refills: 0 | Status: COMPLETED | OUTPATIENT
Start: 2017-04-17 | End: 2017-04-17

## 2017-04-17 RX ORDER — SIMETHICONE 80 MG/1
80 TABLET, CHEWABLE ORAL ONCE
Qty: 0 | Refills: 0 | Status: COMPLETED | OUTPATIENT
Start: 2017-04-17 | End: 2017-04-17

## 2017-04-17 RX ADMIN — Medication 40 MILLIEQUIVALENT(S): at 10:46

## 2017-04-17 RX ADMIN — HEPARIN SODIUM 1000 UNIT(S)/HR: 5000 INJECTION INTRAVENOUS; SUBCUTANEOUS at 16:39

## 2017-04-17 RX ADMIN — SIMETHICONE 80 MILLIGRAM(S): 80 TABLET, CHEWABLE ORAL at 15:52

## 2017-04-17 RX ADMIN — Medication 3 MILLILITER(S): at 12:17

## 2017-04-17 RX ADMIN — Medication 200 MILLIGRAM(S): at 09:12

## 2017-04-17 RX ADMIN — Medication 3 MILLILITER(S): at 00:26

## 2017-04-17 RX ADMIN — Medication 3 MILLILITER(S): at 06:23

## 2017-04-17 RX ADMIN — ENOXAPARIN SODIUM 40 MILLIGRAM(S): 100 INJECTION SUBCUTANEOUS at 10:46

## 2017-04-17 RX ADMIN — Medication 3 MILLILITER(S): at 23:15

## 2017-04-17 RX ADMIN — Medication 40 MILLIGRAM(S): at 06:01

## 2017-04-17 RX ADMIN — HEPARIN SODIUM 1100 UNIT(S)/HR: 5000 INJECTION INTRAVENOUS; SUBCUTANEOUS at 09:12

## 2017-04-17 RX ADMIN — Medication 3 MILLILITER(S): at 17:17

## 2017-04-17 RX ADMIN — Medication 5 MILLIGRAM(S): at 06:01

## 2017-04-17 NOTE — PROGRESS NOTE ADULT - SUBJECTIVE AND OBJECTIVE BOX
Patient is a 61y old  Male who presents with a chief complaint of sob (15 Apr 2017 09:04)      INTERVAL HPI/OVERNIGHT EVENTS:   no new episode  MEDICATIONS  (STANDING):  enoxaparin Injectable 40milliGRAM(s) SubCutaneous every 24 hours  furosemide   Injectable 40milliGRAM(s) IV Push daily  heparin  Infusion. Unit(s)/Hr IV Continuous <Continuous>  ALBUTerol/ipratropium for Nebulization 3milliLiter(s) Nebulizer every 6 hours  tiotropium 18 MICROgram(s) Capsule 1Capsule(s) Inhalation daily    MEDICATIONS  (PRN):  ALBUTerol    90 MICROgram(s) HFA Inhaler 1Puff(s) Inhalation every 6 hours PRN Shortness of Breath and/or Wheezing  guaiFENesin    Syrup 200milliGRAM(s) Oral every 6 hours PRN Cough      Allergies    No Known Allergies    Intolerances        REVIEW OF SYSTEMS:  CONSTITUTIONAL: No fever, weight loss, or fatigue  EYES: No eye pain, visual disturbances, or discharge  ENMT:  No difficulty hearing, tinnitus, vertigo; No sinus or throat pain  NECK: No pain or stiffness  BREASTS: No pain, masses, or nipple discharge  RESPIRATORY: No cough, wheezing, chills or hemoptysis; No shortness of breath  CARDIOVASCULAR: No chest pain, palpitations, dizziness, or leg swelling  GASTROINTESTINAL: No abdominal or epigastric pain. No nausea, vomiting, or hematemesis; No diarrhea or constipation. No melena or hematochezia.  GENITOURINARY: No dysuria, frequency, hematuria, or incontinence  NEUROLOGICAL: No headaches, memory loss, loss of strength, numbness, or tremors  SKIN: No itching, burning, rashes, or lesions   LYMPH NODES: No enlarged glands  ENDOCRINE: No heat or cold intolerance; No hair loss  MUSCULOSKELETAL: No joint pain or swelling; No muscle, back, or extremity pain  PSYCHIATRIC: No depression, anxiety, mood swings, or difficulty sleeping  HEME/LYMPH: No easy bruising, or bleeding gums  ALLERGY AND IMMUNOLOGIC: No hives or eczema    Vital Signs Last 24 Hrs  T(C): 37.1, Max: 37.1 (04-17 @ 10:52)  T(F): 98.8, Max: 98.8 (04-17 @ 10:52)  HR: 93 (53 - 96)  BP: 129/82 (116/79 - 145/100)  BP(mean): --  RR: 16 (16 - 18)  SpO2: 95% (93% - 98%)    PHYSICAL EXAM:  GENERAL: NAD, well-groomed, well-developed  HEAD:  Atraumatic, Normocephalic  EYES: EOMI, PERRLA, conjunctiva and sclera clear  ENMT: No tonsillar erythema, exudates, or enlargement; Moist mucous membranes, Good dentition, No lesions  NECK: Supple, No JVD, Normal thyroid  NERVOUS SYSTEM:  Alert & Oriented X3, Good concentration; Motor Strength 5/5 B/L upper and lower extremities; DTRs 2+ intact and symmetric  CHEST/LUNG: Clear to percussion bilaterally; No rales, rhonchi, wheezing, or rubs  HEART: Regular rate and rhythm; No murmurs, rubs, or gallops  ABDOMEN: Soft, Nontender, Nondistended; Bowel sounds present  EXTREMITIES:  2+ Peripheral Pulses, No clubbing, cyanosis, or edema  LYMPH: No lymphadenopathy noted  SKIN: No rashes or lesions    LABS:                        14.9   8.0   )-----------( 292      ( 17 Apr 2017 08:23 )             43.4     04-17    139  |  98  |  21  ----------------------------<  89  3.4<L>   |  31  |  1.37<H>    Ca    8.6      17 Apr 2017 08:23      PTT - ( 17 Apr 2017 08:23 )  PTT:74.2 sec    CAPILLARY BLOOD GLUCOSE    CULTURES:    HEMOGLOBIN A1C:    CHOLESTEROL:        RADIOLOGY & ADDITIONAL TESTS:

## 2017-04-17 NOTE — DIETITIAN INITIAL EVALUATION ADULT. - OTHER INFO
Pt seen today due to MD referral and admission Dx of CHF. Pt lives @ home c his girlfriend. They both do the food shopping/cooking. Mostly fast foods are consumed. Denies food Allergies. Last BM today 4/17. Consuming 100 % of meals.

## 2017-04-17 NOTE — PROGRESS NOTE ADULT - SUBJECTIVE AND OBJECTIVE BOX
Patient is a 61y old  Male who presents with a chief complaint of sob (15 Apr 2017 09:04)NSTEMI  CHF        PAST MEDICAL & SURGICAL HISTORY:  Asthma  Hypertension      Summary of admission HPI:                PREVIOUS DIAGNOSTIC TESTING:      ECHO  FINDINGS:    STRESS  FINDINGS:    CATHETERIZATION  FINDINGS:    ELECTROPHYSIOLOGY STUDY  FINDINGS:    CAROTID ULTRASOUND:  FINDINGS    VENOUS DUPLEX SCAN:  FINDINGS:    CHEST CT PULMONARY ANGIO with IV Contrast:  FINDINGS:  MEDICATIONS  (STANDING):  enoxaparin Injectable 40milliGRAM(s) SubCutaneous every 24 hours  furosemide   Injectable 40milliGRAM(s) IV Push daily  heparin  Infusion. Unit(s)/Hr IV Continuous <Continuous>  ALBUTerol/ipratropium for Nebulization 3milliLiter(s) Nebulizer every 6 hours  tiotropium 18 MICROgram(s) Capsule 1Capsule(s) Inhalation daily    MEDICATIONS  (PRN):  ALBUTerol    90 MICROgram(s) HFA Inhaler 1Puff(s) Inhalation every 6 hours PRN Shortness of Breath and/or Wheezing  guaiFENesin    Syrup 200milliGRAM(s) Oral every 6 hours PRN Cough      FAMILY HISTORY:      SOCIAL HISTORY:    CIGARETTES:    ALCOHOL:    REVIEW OF SYSTEMS:    CONSTITUTIONAL: No fever, weight loss, chills, shakes, or fatigue  EYES: No eye pain, visual disturbances, or discharge  ENMT:  No difficulty hearing, tinnitus, vertigo; No sinus or throat pain  NECK: No pain or stiffness  BREASTS: No pain, masses, or nipple discharge  RESPIRATORY: No cough, wheezing, hemoptysis, or shortness of breath  CARDIOVASCULAR: No chest pain, dyspnea, palpitations, dizziness, syncope, paroxysmal nocturnal dyspnea, orthopnea, or arm or leg swelling  GASTROINTESTINAL: No abdominal  or epigastric pain, nausea, vomiting, hematemesis, diarrhea, constipation, melena or bright red blood.  GENITOURINARY: No dysuria, nocturia, hematuria, or urinary incontinence  NEUROLOGICAL: No headaches, memory loss, slurred speech, limb weakness, loss of strength, numbness, or tremors  SKIN: No itching, burning, rashes, or lesions   LYMPH NODES: No enlarged glands  ENDOCRINE: No heat or cold intolerance, or hair loss  MUSCULOSKELETAL: No joint pain or swelling, muscle, back, or extremity pain  PSYCHIATRIC: No depression, anxiety, or difficulty sleeping  HEME/LYMPH: No easy bruising or bleeding gums  ALLERY AND IMMUNOLOGIC: No hives or rash.      Vital Signs Last 24 Hrs  T(C): 37.1, Max: 37.1 (04-17 @ 10:52)  T(F): 98.8, Max: 98.8 (04-17 @ 10:52)  HR: 93 (53 - 96)  BP: 129/82 (116/79 - 145/100)  BP(mean): --  RR: 16 (16 - 18)  SpO2: 95% (93% - 98%)    PHYSICAL EXAM:    GENERAL: In no apparent distress, well nourished, and hydrated.  HEAD:  Atraumatic, Normocephalic  EYES: EOMI, PERRLA, conjunctiva and sclera clear  ENMT: No tonsillar erythema, exudates, or enlargement; Moist mucous membranes, Good dentition, No lesions  NECK: Supple and normal thyroid.  No JVD or carotid bruit.  Carotid pulse is 2+ bilaterally.  HEART: Regular rate and rhythm; No murmurs, rubs, or gallops.  PULMONARY: Clear to auscultation and perfusion.  No rales, wheezing, or rhonchi bilaterally.  ABDOMEN: Soft, Nontender, Nondistended; Bowel sounds present  EXTREMITIES:  2+ Peripheral Pulses, No clubbing, cyanosis, or edema  LYMPH: No lymphadenopathy noted  NEUROLOGICAL: Grossly nonfocal          INTERPRETATION OF TELEMETRY:    ECG:    I&O's Detail    I & Os for current day (as of 17 Apr 2017 12:07)  =============================================  IN:    Oral Fluid: 1340 ml    heparin  Infusion.: 132 ml    Total IN: 1472 ml  ---------------------------------------------  OUT:    Voided: 4000 ml    Total OUT: 4000 ml  ---------------------------------------------  Total NET: -2528 ml      LABS:                        14.9   8.0   )-----------( 292      ( 17 Apr 2017 08:23 )             43.4     04-17    139  |  98  |  21  ----------------------------<  89  3.4<L>   |  31  |  1.37<H>    Ca    8.6      17 Apr 2017 08:23      CARDIAC MARKERS ( 17 Apr 2017 08:23 )  .057 ng/mL / x     / 156 U/L / x     / 2.3 ng/mL      PTT - ( 17 Apr 2017 08:23 )  PTT:74.2 sec    BNP  I&O's Detail    I & Os for current day (as of 17 Apr 2017 12:07)  =============================================  IN:    Oral Fluid: 1340 ml    heparin  Infusion.: 132 ml    Total IN: 1472 ml  ---------------------------------------------  OUT:    Voided: 4000 ml    Total OUT: 4000 ml  ---------------------------------------------  Total NET: -2528 ml    Daily     Daily     RADIOLOGY & ADDITIONAL STUDIES:

## 2017-04-17 NOTE — DIETITIAN INITIAL EVALUATION ADULT. - NS AS NUTRI INTERV ED CONTENT
Low sodium; CHF Nutrition Therapy; Weight loss tips/Survival information/Purpose of the nutrition education

## 2017-04-17 NOTE — PROGRESS NOTE ADULT - SUBJECTIVE AND OBJECTIVE BOX
Informed by RN, patient c/o abdominal cramping. Patient seen and examined at bedside.  Patient sitting upright in bed. C/o non-radiating "severe" lower abdominal pain, cramping in nature that began 2 hours ago but only lasted 5min. Patient states he ate breakfast this morning, tolerating diet. Complains mostly of a cough with sputum production that has been persistent for about a year and Left sided rib pain due to multiple rib fractures.   Denies n/v, abdominal pain, cp, sob, dizziness, f/c.     Vital Signs Last 24 Hrs  T(C): 37.1, Max: 37.1 (04-17 @ 10:52)  T(F): 98.8, Max: 98.8 (04-17 @ 10:52)  HR: 93 (53 - 96)  BP: 129/82 (116/79 - 145/100)  BP(mean): --  RR: 16 (16 - 18)  SpO2: 95% (93% - 98%)    General: NAD  Heart: No JVD. S1S2  Lungs: bibasilar crackles  Extr: peripheral pulses 2+ b/l.    62yo Male w/ PMH HTN, Asthma, CHF admitted with acute on chronic CHF exacerbation a/w NSTEMI. Multiple Left sided rib fractures.  -EKG, and CE's ordered stat  -Hypokalemia-->repleted  -Incentive Spirometer ordered  -Will continue to monitor.  -Continue current management  -Will discuss with attending and Dr. Henson Informed by RN, patient c/o abdominal cramping. Patient seen and examined at bedside.  Patient sitting upright in bed. C/o non-radiating "severe" lower abdominal pain, cramping in nature that began 2 hours ago but only lasted 5min. Patient states he ate breakfast this morning, tolerating diet. Complains mostly of a cough with sputum production that has been persistent for about a year and Left sided rib pain due to multiple rib fractures. Patient believes he has whooping cough because he cannot stop coughing.  Denies n/v, abdominal pain, cp, sob, dizziness, f/c.     Vital Signs Last 24 Hrs  T(C): 37.1, Max: 37.1 (04-17 @ 10:52)  T(F): 98.8, Max: 98.8 (04-17 @ 10:52)  HR: 93 (53 - 96)  BP: 129/82 (116/79 - 145/100)  BP(mean): --  RR: 16 (16 - 18)  SpO2: 95% (93% - 98%)    General: NAD  Heart: No JVD. S1S2  Lungs: bibasilar crackles  Extr: peripheral pulses 2+ b/l.    EKG -no significant changes from previous EKG.    62yo Male w/ PMH HTN, Asthma, CHF admitted with acute on chronic CHF exacerbation a/w NSTEMI. Multiple Left sided rib fractures.  -CE's ordered stat  -Hypokalemia-->repleted  -ACEI d/c due to cough  -Incentive Spirometer ordered  -Will continue to monitor.  -Continue current management  -Patient will be transferred for cath tomorrow possibly  -Above discussed with Dr. Flores. Informed by RN, patient c/o abdominal cramping. Patient seen and examined at bedside.  Patient sitting upright in bed. C/o non-radiating "severe" lower abdominal pain, cramping in nature that began 2 hours ago but only lasted 5min. Patient states he ate breakfast this morning, tolerating diet. Complains mostly of a cough with sputum production that has been persistent for about a year and Left sided rib pain due to multiple rib fractures. Patient believes he has whooping cough because he cannot stop coughing.  Denies n/v, abdominal pain, cp, sob, dizziness, f/c.     Vital Signs Last 24 Hrs  T(C): 37.1, Max: 37.1 (04-17 @ 10:52)  T(F): 98.8, Max: 98.8 (04-17 @ 10:52)  HR: 93 (53 - 96)  BP: 129/82 (116/79 - 145/100)  BP(mean): --  RR: 16 (16 - 18)  SpO2: 95% (93% - 98%)    General: NAD  Heart: No JVD. S1S2  Lungs: bibasilar crackles  Abdomen: soft, NT, +BS  Extr: peripheral pulses 2+ b/l.    EKG -no significant changes from previous EKG.    60yo Male w/ PMH HTN, Asthma, CHF admitted with acute on chronic CHF exacerbation a/w NSTEMI. Multiple Left sided rib fractures.  -CE's ordered stat  -Hypokalemia-->repleted  -ACEI d/c due to cough  -Incentive Spirometer ordered  -Will continue to monitor.  -Continue current management  -Patient will be transferred for cath tomorrow possibly  -Above discussed with Dr. Flores.    Addendum:  Patient still c/o intermittent cramping lower abdominal pain, worse after coughing and with laying down, feels better when coughing stops and when he sits up.  -UA ordered, Robitussin ordered Informed by RN, patient c/o abdominal cramping. Patient seen and examined at bedside.  Patient sitting upright in bed. C/o non-radiating "severe" lower abdominal pain, cramping in nature that began 2 hours ago but only lasted 5min. Patient states he ate breakfast this morning, tolerating diet. Complains mostly of a cough with sputum production that has been persistent for about a year and Left sided rib pain due to multiple rib fractures. Patient believes he has whooping cough because he cannot stop coughing.  Denies n/v, abdominal pain, cp, sob, dizziness, f/c.     Vital Signs Last 24 Hrs  T(C): 37.1, Max: 37.1 (04-17 @ 10:52)  T(F): 98.8, Max: 98.8 (04-17 @ 10:52)  HR: 93 (53 - 96)  BP: 129/82 (116/79 - 145/100)  BP(mean): --  RR: 16 (16 - 18)  SpO2: 95% (93% - 98%)    General: NAD  Heart: No JVD. S1S2  Lungs: bibasilar crackles  Abdomen: soft, NT, +BS  Extr: peripheral pulses 2+ b/l.    EKG -no significant changes from previous EKG.    60yo Male w/ PMH HTN, Asthma, CHF admitted with acute on chronic CHF exacerbation a/w NSTEMI. Multiple Left sided rib fractures.  -CE's ordered stat  -Hypokalemia-->repleted  -ACEI d/c due to cough  -Incentive Spirometer ordered  -Will continue to monitor.  -Continue current management  -Patient will be transferred for cath tomorrow possibly  -Above discussed with Dr. Flores.    Addendum:  Patient still c/o intermittent cramping lower abdominal pain, worse after coughing and with laying down, feels better when coughing stops and when he sits up.  -UA ordered, Robitussin.

## 2017-04-17 NOTE — DIETITIAN INITIAL EVALUATION ADULT. - PERTINENT LABORATORY DATA
04-17 Na139 mmol/L Glu 89 mg/dL K+ 3.4 mmol/L<L> Cr  1.37 mg/dL<H> BUN 21 mg/dL Phos n/a   Alb n/a   PAB n/a

## 2017-04-18 ENCOUNTER — INPATIENT (INPATIENT)
Facility: HOSPITAL | Age: 62
LOS: 2 days | Discharge: HOME | DRG: 286 | End: 2017-04-21
Attending: SPECIALIST | Admitting: SPECIALIST
Payer: COMMERCIAL

## 2017-04-18 VITALS
TEMPERATURE: 98 F | HEIGHT: 67 IN | OXYGEN SATURATION: 96 % | DIASTOLIC BLOOD PRESSURE: 92 MMHG | WEIGHT: 211.64 LBS | SYSTOLIC BLOOD PRESSURE: 123 MMHG | RESPIRATION RATE: 18 BRPM | HEART RATE: 99 BPM

## 2017-04-18 VITALS — OXYGEN SATURATION: 98 %

## 2017-04-18 DIAGNOSIS — I21.4 NON-ST ELEVATION (NSTEMI) MYOCARDIAL INFARCTION: ICD-10-CM

## 2017-04-18 DIAGNOSIS — D16.9 BENIGN NEOPLASM OF BONE AND ARTICULAR CARTILAGE, UNSPECIFIED: Chronic | ICD-10-CM

## 2017-04-18 LAB
ANION GAP SERPL CALC-SCNC: 7 MMOL/L — SIGNIFICANT CHANGE UP (ref 5–17)
APTT BLD: 78.2 SEC — HIGH (ref 27.5–37.4)
BUN SERPL-MCNC: 21 MG/DL — SIGNIFICANT CHANGE UP (ref 7–23)
CALCIUM SERPL-MCNC: 8.9 MG/DL — SIGNIFICANT CHANGE UP (ref 8.5–10.1)
CHLORIDE SERPL-SCNC: 100 MMOL/L — SIGNIFICANT CHANGE UP (ref 96–108)
CO2 SERPL-SCNC: 32 MMOL/L — HIGH (ref 22–31)
CREAT SERPL-MCNC: 1.43 MG/DL — HIGH (ref 0.5–1.3)
GLUCOSE SERPL-MCNC: 98 MG/DL — SIGNIFICANT CHANGE UP (ref 70–99)
HCT VFR BLD CALC: 46.3 % — SIGNIFICANT CHANGE UP (ref 39–50)
HGB BLD-MCNC: 15.7 G/DL — SIGNIFICANT CHANGE UP (ref 13–17)
MCHC RBC-ENTMCNC: 31.3 PG — SIGNIFICANT CHANGE UP (ref 27–34)
MCHC RBC-ENTMCNC: 34 GM/DL — SIGNIFICANT CHANGE UP (ref 32–36)
MCV RBC AUTO: 92.1 FL — SIGNIFICANT CHANGE UP (ref 80–100)
PLATELET # BLD AUTO: 309 K/UL — SIGNIFICANT CHANGE UP (ref 150–400)
POTASSIUM SERPL-MCNC: 4 MMOL/L — SIGNIFICANT CHANGE UP (ref 3.5–5.3)
POTASSIUM SERPL-SCNC: 4 MMOL/L — SIGNIFICANT CHANGE UP (ref 3.5–5.3)
RBC # BLD: 5.02 M/UL — SIGNIFICANT CHANGE UP (ref 4.2–5.8)
RBC # FLD: 13.4 % — SIGNIFICANT CHANGE UP (ref 11–15)
SODIUM SERPL-SCNC: 139 MMOL/L — SIGNIFICANT CHANGE UP (ref 135–145)
WBC # BLD: 6.7 K/UL — SIGNIFICANT CHANGE UP (ref 3.8–10.5)
WBC # FLD AUTO: 6.7 K/UL — SIGNIFICANT CHANGE UP (ref 3.8–10.5)

## 2017-04-18 PROCEDURE — 93010 ELECTROCARDIOGRAM REPORT: CPT

## 2017-04-18 RX ORDER — CARVEDILOL PHOSPHATE 80 MG/1
25 CAPSULE, EXTENDED RELEASE ORAL EVERY 12 HOURS
Qty: 0 | Refills: 0 | Status: DISCONTINUED | OUTPATIENT
Start: 2017-04-18 | End: 2017-04-21

## 2017-04-18 RX ORDER — LISINOPRIL 2.5 MG/1
20 TABLET ORAL DAILY
Qty: 0 | Refills: 0 | Status: DISCONTINUED | OUTPATIENT
Start: 2017-04-19 | End: 2017-04-21

## 2017-04-18 RX ORDER — LOVASTATIN 20 MG
1 TABLET ORAL
Qty: 0 | Refills: 0 | COMMUNITY

## 2017-04-18 RX ORDER — ALBUTEROL 90 UG/1
1 AEROSOL, METERED ORAL EVERY 6 HOURS
Qty: 0 | Refills: 0 | Status: DISCONTINUED | OUTPATIENT
Start: 2017-04-18 | End: 2017-04-21

## 2017-04-18 RX ORDER — FUROSEMIDE 40 MG
40 TABLET ORAL DAILY
Qty: 0 | Refills: 0 | Status: DISCONTINUED | OUTPATIENT
Start: 2017-04-18 | End: 2017-04-19

## 2017-04-18 RX ORDER — ATORVASTATIN CALCIUM 80 MG/1
40 TABLET, FILM COATED ORAL AT BEDTIME
Qty: 0 | Refills: 0 | Status: DISCONTINUED | OUTPATIENT
Start: 2017-04-18 | End: 2017-04-21

## 2017-04-18 RX ORDER — ASPIRIN/CALCIUM CARB/MAGNESIUM 324 MG
1 TABLET ORAL
Qty: 30 | Refills: 0 | OUTPATIENT
Start: 2017-04-18 | End: 2017-05-18

## 2017-04-18 RX ADMIN — Medication 3 MILLILITER(S): at 11:26

## 2017-04-18 RX ADMIN — HEPARIN SODIUM 1100 UNIT(S)/HR: 5000 INJECTION INTRAVENOUS; SUBCUTANEOUS at 09:45

## 2017-04-18 RX ADMIN — CARVEDILOL PHOSPHATE 25 MILLIGRAM(S): 80 CAPSULE, EXTENDED RELEASE ORAL at 21:24

## 2017-04-18 RX ADMIN — HEPARIN SODIUM 1200 UNIT(S)/HR: 5000 INJECTION INTRAVENOUS; SUBCUTANEOUS at 00:26

## 2017-04-18 RX ADMIN — Medication 40 MILLIGRAM(S): at 05:43

## 2017-04-18 RX ADMIN — ATORVASTATIN CALCIUM 40 MILLIGRAM(S): 80 TABLET, FILM COATED ORAL at 21:24

## 2017-04-18 RX ADMIN — Medication 3 MILLILITER(S): at 05:48

## 2017-04-18 NOTE — H&P CARDIOLOGY - PATIENT REFERRED TO
Past Medical History   Diagnosis Date     BPH (benign prostatic hyperplasia)      Cholelithiasis      Hyperlipidemia      Liver cirrhosis secondary to ESTRADA (H)      Type II diabetes mellitus (H)      Patient Active Problem List   Diagnosis     Liver cirrhosis secondary to ESTRADA (H)     Type II diabetes mellitus (H)     Past Surgical History   Procedure Laterality Date     Knee surgery Left      Esophagoscopy, gastroscopy, duodenoscopy (egd), combined N/A 11/17/2016     Procedure: COMBINED ESOPHAGOSCOPY, GASTROSCOPY, DUODENOSCOPY (EGD);  Surgeon: Santi Rosas MD;  Location:  GI     Social History     Social History     Marital status:      Spouse name: N/A     Number of children: N/A     Years of education: N/A     Occupational History     Not on file.     Social History Main Topics     Smoking status: Never Smoker     Smokeless tobacco: Current User      Comment: 1 tin per week     Alcohol use No      Comment: quit Sept. 1996     Drug use: No     Sexual activity: Not on file     Other Topics Concern     Not on file     Social History Narrative     Family History   Problem Relation Age of Onset     Prostate Cancer Maternal Grandfather      Colon Cancer Father 60     Pancreatic Cancer Father 60     Liver Disease No family hx of      Lab Results   Component Value Date    A1C 7.8 10/25/2016      Creatinine   Date Value Ref Range Status   12/19/2016 1.14 0.66 - 1.25 mg/dL Final   ]  Inr          1.27         12/19/2017  SUBJECTIVE FINDINGS:  A 54-year-old male presents from [facility] for toenail problems.  He relates that he has seen Dr. Bowen, who thought shoes were too tight.  He changed shoes.  He was wearing a pair of Nikes that were narrow, and he does not think it has made any difference, though.  The hallus nail has started to turn color.  He relates no injuries.  It has stayed about the same.  The left one start in October, the right one probably started in November.  He is diabetic, relates  he does not really get any neuropathy or numbness or tingling in his feet.  Occasionally he will get a sharp pain in his feet.  He uses lotion daily.  He is walking 3-6 miles every day, and he has continued to do that.        OBJECTIVE FINDINGS:  DP and PT are 2/4 bilaterally.  Sharp/dull is intact with 5.07 [place] monofilament bilaterally.  There is no erythema, no drainage, no odor, no calor bilaterally.  He has distally contracted digits 2-5 bilaterally.  He has a very mild dorsomedial first MPJ prominence bilaterally.  He has loose hallux nails bilaterally with subungual dried blood, right greater than left, and some subungual debris and discoloration mostly in the left nail.  There is no erythema, no drainage, no odor, no calor bilaterally.  No gross tendon voids.      ASSESSMENT AND PLAN:  Onycholysis of hallux nails bilaterally.  He is diabetic.  Diagnosis and treatment options were discussed with him.  Hallus nails were reduced bilaterally upon consent.  There are no underlying ulcerations.  His New Balance appeared to fit well, and he will continue those.  Diagnosis and treatment were discussed with him.  He will return to clinic and see me in about 6 months for diabetic foot check.        Cardiac catherization with possible intervention

## 2017-04-18 NOTE — PATIENT PROFILE ADULT. - ABILITY TO HEAR (WITH HEARING AID OR HEARING APPLIANCE IF NORMALLY USED):
[place] in Left ear/Mildly to Moderately Impaired: difficulty hearing in some environments or speaker may need to increase volume or speak distinctly

## 2017-04-18 NOTE — DISCHARGE NOTE ADULT - CARE PLAN
Principal Discharge DX:	Acute on chronic systolic congestive heart failure  Goal:	no ACE due to cough  Instructions for follow-up, activity and diet:	for cath  Secondary Diagnosis:	Essential hypertension  Secondary Diagnosis:	NSTEMI (non-ST elevated myocardial infarction)  Secondary Diagnosis:	Cough due to ACE inhibitor

## 2017-04-18 NOTE — DISCHARGE NOTE ADULT - MEDICATION SUMMARY - MEDICATIONS TO STOP TAKING
I will STOP taking the medications listed below when I get home from the hospital:    enalapril 5 mg oral tablet  -- 1 tab(s) by mouth 2 times a day

## 2017-04-18 NOTE — H&P CARDIOLOGY - HISTORY OF PRESENT ILLNESS
62 y/o male with pmh of CVA with right sided weakness, HTN         TTE -  IMPRESSION:  Summary:   1. Left ventricular ejection fraction, by visual estimation, is 35 to   40%.   2. Mildly to moderately decreased global left ventricular systolic   function.   3. Mildly increased LV wall thickness.   4. The right atrium is normal in size.   5. There is no evidence of pericardial effusion.   6. Degenerative mitral valve.   7. Moderate mitral valve regurgitation.   8. Degenerative tricuspid valve.   9. Mild-moderate tricuspid regurgitation.  10. Sclerotic aortic valve with normal opening.  11. Moderate pulmonic valve regurgitation.  12. Estimated pulmonary artery systolic pressure is 53.2 mmHg assuming a   right atrial pressure of 10 mmHg, which is consistent with moderate   pulmonary hypertension.  13. Pulmonary hypertension is present.  14. Mildly enlarged left atrium.  15. No evidence of aortic stenosis.  16. The right atrial pressure is normal. 60 y/o male with pmh of CVA- no residual, HTN, HLD and HFrEF 35-40% in 2016, Moderate MR, Moderate TR , Moderate Pulm HTN and CKD III who was admitted to [facility] on 4/16/2017 c/o dypnea, dry cough and left sided CP.  Patient states that while at work earlier that day he coughed and heard a cracking sound in his left chest.  Trop I .115 --> .081 ---> .057 - ACS Heparin Administered, ProBNP 9421 - IV diuresis initiated.  CT angio of the chest negative for PE, shows Cardiomegaly.  Xray of ribs showing multiple left sided rib fracture deformities.  Patient was evaluated by Dr. Henson and is now transferred to [facility] for Cardiac Cath in setting of NSTEMI.  Patient is a poor historian and his medication compliance is questionable.

## 2017-04-18 NOTE — DISCHARGE NOTE ADULT - MEDICATION SUMMARY - MEDICATIONS TO TAKE
I will START or STAY ON the medications listed below when I get home from the hospital:    PT was admitted to the [facility] from 4/15/17 to 4/18/17  -- 1  -- Indication: For Note    Ecotrin Adult Low Strength 81 mg oral delayed release tablet  -- 1 tab(s) by mouth once a day  -- Swallow whole.  Do not crush.  Take with food or milk.    -- Indication: For NSTEMI (non-ST elevated myocardial infarction)    furosemide 40 mg oral tablet  -- 1 tab(s) by mouth once a day  -- Indication: For CONGESTIVE HEART FAILURE EXACERBATION

## 2017-04-18 NOTE — DISCHARGE NOTE ADULT - PATIENT PORTAL LINK FT
“You can access the [employer] Patient Portal, offered by [facility], by registering with the following website: [email or web address]”

## 2017-04-18 NOTE — H&P CARDIOLOGY - PMH
Cerebrovascular accident (CVA), unspecified mechanism  in 2007 with rt sided weakness- recovered  Essential hypertension Cardiomegaly    Cerebrovascular accident (CVA), unspecified mechanism    Chronic systolic heart failure    CKD (chronic kidney disease) stage 3, GFR 30-59 ml/min    Essential hypertension    Mitral valve insufficiency, unspecified etiology    Pulmonary hypertension

## 2017-04-18 NOTE — DISCHARGE NOTE ADULT - HOSPITAL COURSE
61yoM; with pmh signif for CHF (EF = 40%), HTN, HLD; now p/w chest pain. patient states he has been coughing for 3 days, progressively worsening and coughed, felt pinching pain in left lower chest wall

## 2017-04-19 DIAGNOSIS — I50.9 HEART FAILURE, UNSPECIFIED: ICD-10-CM

## 2017-04-19 LAB
ANION GAP SERPL CALC-SCNC: 13 MMOL/L — SIGNIFICANT CHANGE UP (ref 5–17)
BASOPHILS # BLD AUTO: 0 K/UL — SIGNIFICANT CHANGE UP (ref 0–0.2)
BASOPHILS NFR BLD AUTO: 0.7 % — SIGNIFICANT CHANGE UP (ref 0–2)
BUN SERPL-MCNC: 30 MG/DL — HIGH (ref 7–23)
CALCIUM SERPL-MCNC: 9 MG/DL — SIGNIFICANT CHANGE UP (ref 8.4–10.5)
CHLORIDE SERPL-SCNC: 97 MMOL/L — SIGNIFICANT CHANGE UP (ref 96–108)
CO2 SERPL-SCNC: 27 MMOL/L — SIGNIFICANT CHANGE UP (ref 22–31)
CREAT SERPL-MCNC: 1.3 MG/DL — SIGNIFICANT CHANGE UP (ref 0.5–1.3)
EOSINOPHIL # BLD AUTO: 0.5 K/UL — SIGNIFICANT CHANGE UP (ref 0–0.5)
EOSINOPHIL NFR BLD AUTO: 6.8 % — HIGH (ref 0–6)
GLUCOSE SERPL-MCNC: 93 MG/DL — SIGNIFICANT CHANGE UP (ref 70–99)
HCT VFR BLD CALC: 44.4 % — SIGNIFICANT CHANGE UP (ref 39–50)
HGB BLD-MCNC: 14.7 G/DL — SIGNIFICANT CHANGE UP (ref 13–17)
LYMPHOCYTES # BLD AUTO: 0.9 K/UL — LOW (ref 1–3.3)
LYMPHOCYTES # BLD AUTO: 13.1 % — SIGNIFICANT CHANGE UP (ref 13–44)
MCHC RBC-ENTMCNC: 31.1 PG — SIGNIFICANT CHANGE UP (ref 27–34)
MCHC RBC-ENTMCNC: 33.1 GM/DL — SIGNIFICANT CHANGE UP (ref 32–36)
MCV RBC AUTO: 94 FL — SIGNIFICANT CHANGE UP (ref 80–100)
MONOCYTES # BLD AUTO: 0.7 K/UL — SIGNIFICANT CHANGE UP (ref 0–0.9)
MONOCYTES NFR BLD AUTO: 11.2 % — SIGNIFICANT CHANGE UP (ref 2–14)
NEUTROPHILS # BLD AUTO: 4.6 K/UL — SIGNIFICANT CHANGE UP (ref 1.8–7.4)
NEUTROPHILS NFR BLD AUTO: 68.3 % — SIGNIFICANT CHANGE UP (ref 43–77)
PLATELET # BLD AUTO: 284 K/UL — SIGNIFICANT CHANGE UP (ref 150–400)
POTASSIUM SERPL-MCNC: 4.3 MMOL/L — SIGNIFICANT CHANGE UP (ref 3.5–5.3)
POTASSIUM SERPL-SCNC: 4.3 MMOL/L — SIGNIFICANT CHANGE UP (ref 3.5–5.3)
RBC # BLD: 4.73 M/UL — SIGNIFICANT CHANGE UP (ref 4.2–5.8)
RBC # FLD: 13.8 % — SIGNIFICANT CHANGE UP (ref 10.3–14.5)
SODIUM SERPL-SCNC: 137 MMOL/L — SIGNIFICANT CHANGE UP (ref 135–145)
WBC # BLD: 6.7 K/UL — SIGNIFICANT CHANGE UP (ref 3.8–10.5)
WBC # FLD AUTO: 6.7 K/UL — SIGNIFICANT CHANGE UP (ref 3.8–10.5)

## 2017-04-19 RX ORDER — HEPARIN SODIUM 5000 [USP'U]/ML
5000 INJECTION INTRAVENOUS; SUBCUTANEOUS EVERY 12 HOURS
Qty: 0 | Refills: 0 | Status: DISCONTINUED | OUTPATIENT
Start: 2017-04-19 | End: 2017-04-21

## 2017-04-19 RX ORDER — FUROSEMIDE 40 MG
40 TABLET ORAL
Qty: 0 | Refills: 0 | Status: DISCONTINUED | OUTPATIENT
Start: 2017-04-19 | End: 2017-04-20

## 2017-04-19 RX ADMIN — LISINOPRIL 20 MILLIGRAM(S): 2.5 TABLET ORAL at 05:26

## 2017-04-19 RX ADMIN — CARVEDILOL PHOSPHATE 25 MILLIGRAM(S): 80 CAPSULE, EXTENDED RELEASE ORAL at 05:26

## 2017-04-19 RX ADMIN — Medication 40 MILLIGRAM(S): at 05:26

## 2017-04-19 RX ADMIN — HEPARIN SODIUM 5000 UNIT(S): 5000 INJECTION INTRAVENOUS; SUBCUTANEOUS at 21:30

## 2017-04-19 RX ADMIN — ATORVASTATIN CALCIUM 40 MILLIGRAM(S): 80 TABLET, FILM COATED ORAL at 21:28

## 2017-04-19 RX ADMIN — CARVEDILOL PHOSPHATE 25 MILLIGRAM(S): 80 CAPSULE, EXTENDED RELEASE ORAL at 18:23

## 2017-04-19 RX ADMIN — Medication 40 MILLIGRAM(S): at 21:28

## 2017-04-20 DIAGNOSIS — R05 COUGH: ICD-10-CM

## 2017-04-20 DIAGNOSIS — R06.02 SHORTNESS OF BREATH: ICD-10-CM

## 2017-04-20 DIAGNOSIS — T50.995A ADVERSE EFFECT OF OTHER DRUGS, MEDICAMENTS AND BIOLOGICAL SUBSTANCES, INITIAL ENCOUNTER: ICD-10-CM

## 2017-04-20 DIAGNOSIS — I50.23 ACUTE ON CHRONIC SYSTOLIC (CONGESTIVE) HEART FAILURE: ICD-10-CM

## 2017-04-20 DIAGNOSIS — I21.4 NON-ST ELEVATION (NSTEMI) MYOCARDIAL INFARCTION: ICD-10-CM

## 2017-04-20 DIAGNOSIS — E87.6 HYPOKALEMIA: ICD-10-CM

## 2017-04-20 DIAGNOSIS — R10.30 LOWER ABDOMINAL PAIN, UNSPECIFIED: ICD-10-CM

## 2017-04-20 DIAGNOSIS — E78.5 HYPERLIPIDEMIA, UNSPECIFIED: ICD-10-CM

## 2017-04-20 DIAGNOSIS — R07.81 PLEURODYNIA: ICD-10-CM

## 2017-04-20 DIAGNOSIS — J45.909 UNSPECIFIED ASTHMA, UNCOMPLICATED: ICD-10-CM

## 2017-04-20 DIAGNOSIS — I11.0 HYPERTENSIVE HEART DISEASE WITH HEART FAILURE: ICD-10-CM

## 2017-04-20 LAB
ANION GAP SERPL CALC-SCNC: 14 MMOL/L — SIGNIFICANT CHANGE UP (ref 5–17)
BUN SERPL-MCNC: 43 MG/DL — HIGH (ref 7–23)
CALCIUM SERPL-MCNC: 8.6 MG/DL — SIGNIFICANT CHANGE UP (ref 8.4–10.5)
CHLORIDE SERPL-SCNC: 96 MMOL/L — SIGNIFICANT CHANGE UP (ref 96–108)
CO2 SERPL-SCNC: 26 MMOL/L — SIGNIFICANT CHANGE UP (ref 22–31)
CREAT SERPL-MCNC: 1.82 MG/DL — HIGH (ref 0.5–1.3)
GLUCOSE SERPL-MCNC: 96 MG/DL — SIGNIFICANT CHANGE UP (ref 70–99)
HCT VFR BLD CALC: 44.5 % — SIGNIFICANT CHANGE UP (ref 39–50)
HGB BLD-MCNC: 14.8 G/DL — SIGNIFICANT CHANGE UP (ref 13–17)
MCHC RBC-ENTMCNC: 31.3 PG — SIGNIFICANT CHANGE UP (ref 27–34)
MCHC RBC-ENTMCNC: 33.3 GM/DL — SIGNIFICANT CHANGE UP (ref 32–36)
MCV RBC AUTO: 94.2 FL — SIGNIFICANT CHANGE UP (ref 80–100)
PLATELET # BLD AUTO: 277 K/UL — SIGNIFICANT CHANGE UP (ref 150–400)
POTASSIUM SERPL-MCNC: 4.2 MMOL/L — SIGNIFICANT CHANGE UP (ref 3.5–5.3)
POTASSIUM SERPL-SCNC: 4.2 MMOL/L — SIGNIFICANT CHANGE UP (ref 3.5–5.3)
RBC # BLD: 4.72 M/UL — SIGNIFICANT CHANGE UP (ref 4.2–5.8)
RBC # FLD: 13.5 % — SIGNIFICANT CHANGE UP (ref 10.3–14.5)
SODIUM SERPL-SCNC: 136 MMOL/L — SIGNIFICANT CHANGE UP (ref 135–145)
WBC # BLD: 7 K/UL — SIGNIFICANT CHANGE UP (ref 3.8–10.5)
WBC # FLD AUTO: 7 K/UL — SIGNIFICANT CHANGE UP (ref 3.8–10.5)

## 2017-04-20 PROCEDURE — 93306 TTE W/DOPPLER COMPLETE: CPT | Mod: 26

## 2017-04-20 RX ORDER — FUROSEMIDE 40 MG
40 TABLET ORAL
Qty: 0 | Refills: 0 | COMMUNITY

## 2017-04-20 RX ORDER — ENOXAPARIN SODIUM 100 MG/ML
0 INJECTION SUBCUTANEOUS
Qty: 0 | Refills: 0 | COMMUNITY

## 2017-04-20 RX ORDER — CARVEDILOL PHOSPHATE 80 MG/1
1 CAPSULE, EXTENDED RELEASE ORAL
Qty: 0 | Refills: 0 | COMMUNITY
Start: 2017-04-20

## 2017-04-20 RX ORDER — FUROSEMIDE 40 MG
40 TABLET ORAL DAILY
Qty: 0 | Refills: 0 | Status: DISCONTINUED | OUTPATIENT
Start: 2017-04-20 | End: 2017-04-21

## 2017-04-20 RX ADMIN — Medication 200 MILLIGRAM(S): at 05:32

## 2017-04-20 RX ADMIN — Medication 40 MILLIGRAM(S): at 05:31

## 2017-04-20 RX ADMIN — CARVEDILOL PHOSPHATE 25 MILLIGRAM(S): 80 CAPSULE, EXTENDED RELEASE ORAL at 10:53

## 2017-04-20 RX ADMIN — LISINOPRIL 20 MILLIGRAM(S): 2.5 TABLET ORAL at 05:32

## 2017-04-20 RX ADMIN — HEPARIN SODIUM 5000 UNIT(S): 5000 INJECTION INTRAVENOUS; SUBCUTANEOUS at 05:32

## 2017-04-20 RX ADMIN — Medication 200 MILLIGRAM(S): at 10:53

## 2017-04-20 RX ADMIN — ATORVASTATIN CALCIUM 40 MILLIGRAM(S): 80 TABLET, FILM COATED ORAL at 21:22

## 2017-04-20 RX ADMIN — CARVEDILOL PHOSPHATE 25 MILLIGRAM(S): 80 CAPSULE, EXTENDED RELEASE ORAL at 18:16

## 2017-04-20 RX ADMIN — HEPARIN SODIUM 5000 UNIT(S): 5000 INJECTION INTRAVENOUS; SUBCUTANEOUS at 18:16

## 2017-04-20 RX ADMIN — Medication 200 MILLIGRAM(S): at 21:22

## 2017-04-20 NOTE — DISCHARGE NOTE ADULT - ABILITY TO HEAR (WITH HEARING AID OR HEARING APPLIANCE IF NORMALLY USED):
[place] in Left ear/Mildly to Moderately Impaired: difficulty hearing in some environments or speaker may need to increase volume or speak distinctly

## 2017-04-20 NOTE — DISCHARGE NOTE ADULT - CARE PLAN
Principal Discharge DX:	Acute on chronic systolic (congestive) heart failure  Goal:	remain symptom free  Instructions for follow-up, activity and diet:	Take your medications as prescribed. Follow a  low-salt,  low cholesterol heart healthy diet. Weigh yourself every day; call your doctor if you gain 2 pounds over one to two days or 3 pounds over three days. Get to or maintain a healthy weight; ask your heart failure team for referrals to a registered dietitian if needed. Be active (check with your physician or cardiologist first). Find healthy ways to deal with stress, such as deep breathing, meditation, exercise, and doing hobbies that you enjoy. If you smoke, quit. (A resource to help you stop smoking is the [facility] – phone number [phone].).  Secondary Diagnosis:	CKD (chronic kidney disease) stage 3, GFR 30-59 ml/min  Goal:	remain stable kidney function  Instructions for follow-up, activity and diet:	follow up with your PCP/nephrologist  see Dr Henson or PCP in 4 days (on Monday) for repeat kidney function testing Principal Discharge DX:	Acute on chronic systolic (congestive) heart failure  Goal:	remain symptom free  Instructions for follow-up, activity and diet:	Take your medications as prescribed. Follow a  low-salt,  low cholesterol heart healthy diet. Weigh yourself every day; call your doctor if you gain 2 pounds over one to two days or 3 pounds over three days. Get to or maintain a healthy weight; ask your heart failure team for referrals to a registered dietitian if needed. Be active (check with your physician or cardiologist first). Find healthy ways to deal with stress, such as deep breathing, meditation, exercise, and doing hobbies that you enjoy. If you smoke, quit. (A resource to help you stop smoking is the [facility] – phone number [phone].).  Secondary Diagnosis:	CKD (chronic kidney disease) stage 3, GFR 30-59 ml/min  Goal:	remain stable kidney function  Instructions for follow-up, activity and diet:	follow up with your PCP  see Dr Henson  (on Monday) for repeat kidney function testing Principal Discharge DX:	Acute on chronic systolic (congestive) heart failure  Goal:	remain symptom free  Instructions for follow-up, activity and diet:	Take your medications as prescribed. Follow a  low-salt,  low cholesterol heart healthy diet. Weigh yourself every day; call your doctor if you gain 2 pounds over one to two days or 3 pounds over three days. Get to or maintain a healthy weight; ask your heart failure team for referrals to a registered dietitian if needed. Be active (check with your physician or cardiologist first). Find healthy ways to deal with stress, such as deep breathing, meditation, exercise, and doing hobbies that you enjoy. If you smoke, quit. (A resource to help you stop smoking is the [facility] – phone number [phone].).  Secondary Diagnosis:	CKD (chronic kidney disease) stage 3, GFR 30-59 ml/min  Goal:	remain stable kidney function  Instructions for follow-up, activity and diet:	follow up with your PCP  see Dr Henson  (on Monday) for repeat kidney function testing Principal Discharge DX:	Acute on chronic systolic (congestive) heart failure  Goal:	remain symptom free  Instructions for follow-up, activity and diet:	Take your medications as prescribed. Follow a  low-salt,  low cholesterol heart healthy diet. Weigh yourself every day; call your doctor if you gain 2 pounds over one to two days or 3 pounds over three days. Get to or maintain a healthy weight; ask your heart failure team for referrals to a registered dietitian if needed. Be active (check with your physician or cardiologist first). Find healthy ways to deal with stress, such as deep breathing, meditation, exercise, and doing hobbies that you enjoy. If you smoke, quit. (A resource to help you stop smoking is the [facility] – phone number [phone].).  Secondary Diagnosis:	CKD (chronic kidney disease) stage 3, GFR 30-59 ml/min  Goal:	remain stable kidney function  Instructions for follow-up, activity and diet:	follow up with your PCP  see Dr Henson  (on Monday) for repeat kidney function testing Principal Discharge DX:	Acute on chronic systolic (congestive) heart failure  Goal:	remain symptom free  Instructions for follow-up, activity and diet:	Take your medications as prescribed. Follow a  low-salt,  low cholesterol heart healthy diet. Weigh yourself every day; call your doctor if you gain 2 pounds over one to two days or 3 pounds over three days. Get to or maintain a healthy weight; ask your heart failure team for referrals to a registered dietitian if needed. Be active (check with your physician or cardiologist first). Find healthy ways to deal with stress, such as deep breathing, meditation, exercise, and doing hobbies that you enjoy. If you smoke, quit. (A resource to help you stop smoking is the [facility] – phone number [phone].).  Secondary Diagnosis:	CKD (chronic kidney disease) stage 3, GFR 30-59 ml/min  Goal:	remain stable kidney function  Instructions for follow-up, activity and diet:	follow up with your PCP  see Dr Henson  (on Monday) for repeat kidney function testing Principal Discharge DX:	Acute on chronic systolic (congestive) heart failure  Goal:	remain symptom free  Instructions for follow-up, activity and diet:	Take your medications as prescribed. Follow a  low-salt,  low cholesterol heart healthy diet. Weigh yourself every day; call your doctor if you gain 2 pounds over one to two days or 3 pounds over three days. Get to or maintain a healthy weight; ask your heart failure team for referrals to a registered dietitian if needed. Be active (check with your physician or cardiologist first). Find healthy ways to deal with stress, such as deep breathing, meditation, exercise, and doing hobbies that you enjoy. If you smoke, quit. (A resource to help you stop smoking is the [facility] for Tobacco Control – phone number [phone].).  Secondary Diagnosis:	CKD (chronic kidney disease) stage 3, GFR 30-59 ml/min  Goal:	remain stable kidney function  Instructions for follow-up, activity and diet:	follow up with your PCP  see Dr Henson  (on Monday) for repeat kidney function testing Principal Discharge DX:	Acute on chronic systolic (congestive) heart failure  Goal:	remain symptom free  Instructions for follow-up, activity and diet:	Take your medications as prescribed. Follow a  low-salt,  low cholesterol heart healthy diet. Weigh yourself every day; call your doctor if you gain 2 pounds over one to two days or 3 pounds over three days. Get to or maintain a healthy weight; ask your heart failure team for referrals to a registered dietitian if needed. Be active (check with your physician or cardiologist first). Find healthy ways to deal with stress, such as deep breathing, meditation, exercise, and doing hobbies that you enjoy. If you smoke, quit. (A resource to help you stop smoking is the [facility] – phone number [phone].).  Secondary Diagnosis:	CKD (chronic kidney disease) stage 3, GFR 30-59 ml/min  Goal:	remain stable kidney function  Instructions for follow-up, activity and diet:	follow up with your PCP  see Dr Henson  (on Monday) for repeat kidney function testing Principal Discharge DX:	Acute on chronic systolic (congestive) heart failure  Goal:	remain symptom free  Instructions for follow-up, activity and diet:	Take your medications as prescribed. Follow a  low-salt,  low cholesterol heart healthy diet. Weigh yourself every day; call your doctor if you gain 2 pounds over one to two days or 3 pounds over three days. Get to or maintain a healthy weight; ask your heart failure team for referrals to a registered dietitian if needed. Be active (check with your physician or cardiologist first). Find healthy ways to deal with stress, such as deep breathing, meditation, exercise, and doing hobbies that you enjoy. If you smoke, quit. (A resource to help you stop smoking is the [facility] – phone number [phone].).  Secondary Diagnosis:	CKD (chronic kidney disease) stage 3, GFR 30-59 ml/min  Goal:	remain stable kidney function  Instructions for follow-up, activity and diet:	follow up with your PCP  see Dr Henson  (on Monday) for repeat kidney function testing Principal Discharge DX:	Acute on chronic systolic (congestive) heart failure  Goal:	remain symptom free  Instructions for follow-up, activity and diet:	Take your medications as prescribed. Follow a  low-salt,  low cholesterol heart healthy diet. Weigh yourself every day; call your doctor if you gain 2 pounds over one to two days or 3 pounds over three days. Get to or maintain a healthy weight; ask your heart failure team for referrals to a registered dietitian if needed. Be active (check with your physician or cardiologist first). Find healthy ways to deal with stress, such as deep breathing, meditation, exercise, and doing hobbies that you enjoy. If you smoke, quit. (A resource to help you stop smoking is the [facility] – phone number [phone].).  Secondary Diagnosis:	CKD (chronic kidney disease) stage 3, GFR 30-59 ml/min  Goal:	remain stable kidney function  Instructions for follow-up, activity and diet:	follow up with your PCP  see Dr Henson  (on Monday) for repeat kidney function testing

## 2017-04-20 NOTE — DISCHARGE NOTE ADULT - NS AS ACTIVITY OBS
Walking-Indoors allowed/Showering allowed/No Heavy lifting/straining Walking-Outdoors allowed/Walking-Indoors allowed/Showering allowed/No Heavy lifting/straining

## 2017-04-20 NOTE — DISCHARGE NOTE ADULT - PLAN OF CARE
remain symptom free Take your medications as prescribed. Follow a  low-salt,  low cholesterol heart healthy diet. Weigh yourself every day; call your doctor if you gain 2 pounds over one to two days or 3 pounds over three days. Get to or maintain a healthy weight; ask your heart failure team for referrals to a registered dietitian if needed. Be active (check with your physician or cardiologist first). Find healthy ways to deal with stress, such as deep breathing, meditation, exercise, and doing hobbies that you enjoy. If you smoke, quit. (A resource to help you stop smoking is the [facility] – phone number [phone].). remain stable kidney function follow up with your PCP/nephrologist  see Dr Henson or PCP in 4 days (on Monday) for repeat kidney function testing follow up with your PCP  see Dr Henson  (on Monday) for repeat kidney function testing

## 2017-04-20 NOTE — DISCHARGE NOTE ADULT - MEDICATION SUMMARY - MEDICATIONS TO STOP TAKING
I will STOP taking the medications listed below when I get home from the hospital:    metoprolol succinate 25 mg oral tablet, extended release  -- 1 tab(s) by mouth once a day, home    Lovenox 40 mg/0.4 mL injectable solution  --  injectable once a day, hospital    furosemide 10 mg/mL injectable solution  -- 40 milligram(s) injectable once a day

## 2017-04-20 NOTE — DISCHARGE NOTE ADULT - MEDICATION SUMMARY - MEDICATIONS TO CHANGE
I will SWITCH the dose or number of times a day I take the medications listed below when I get home from the hospital:    enalapril 5 mg oral tablet  -- 1 tab(s) by mouth 2 times a day, hospital

## 2017-04-20 NOTE — DISCHARGE NOTE ADULT - PATIENT PORTAL LINK FT
“You can access the [facility] Patient Portal, offered by [facility], by registering with the following website: [email or web address]”

## 2017-04-20 NOTE — DISCHARGE NOTE ADULT - CONDITION (STATED IN TERMS THAT PERMIT A SPECIFIC MEASURABLE COMPARISON WITH CONDITION ON ADMISSION):
ambulating ambulating around unit- no dizziness, no pain or acute SOB. pt refused home care services

## 2017-04-20 NOTE — DISCHARGE NOTE ADULT - CARE PROVIDER_API CALL
Arjun Henson (MD), Cardiovascular Disease; Internal Medicine  [address]  [place] [address]  Phone: [phone]  Fax: [phone] Arjun Henson (MD), Cardiovascular Disease; Internal Medicine  [address]  [place] [address]  Phone: [phone]  Fax: [phone] Arjun Henson), Cardiovascular Disease; Internal Medicine  [address]  [place] [address]  Phone: [phone]  Fax: [phone]    Reese Mckeon), Medicine  [address] [place] [address]  Phone: [phone]  Fax: [phone]

## 2017-04-21 VITALS
SYSTOLIC BLOOD PRESSURE: 99 MMHG | DIASTOLIC BLOOD PRESSURE: 53 MMHG | OXYGEN SATURATION: 94 % | TEMPERATURE: 98 F | RESPIRATION RATE: 17 BRPM | HEART RATE: 79 BPM

## 2017-04-21 LAB
ANION GAP SERPL CALC-SCNC: 13 MMOL/L — SIGNIFICANT CHANGE UP (ref 5–17)
BUN SERPL-MCNC: 46 MG/DL — HIGH (ref 7–23)
CALCIUM SERPL-MCNC: 9 MG/DL — SIGNIFICANT CHANGE UP (ref 8.4–10.5)
CHLORIDE SERPL-SCNC: 99 MMOL/L — SIGNIFICANT CHANGE UP (ref 96–108)
CO2 SERPL-SCNC: 26 MMOL/L — SIGNIFICANT CHANGE UP (ref 22–31)
CREAT SERPL-MCNC: 1.73 MG/DL — HIGH (ref 0.5–1.3)
GLUCOSE SERPL-MCNC: 96 MG/DL — SIGNIFICANT CHANGE UP (ref 70–99)
HCT VFR BLD CALC: 44.1 % — SIGNIFICANT CHANGE UP (ref 39–50)
HGB BLD-MCNC: 14.7 G/DL — SIGNIFICANT CHANGE UP (ref 13–17)
MCHC RBC-ENTMCNC: 31.6 PG — SIGNIFICANT CHANGE UP (ref 27–34)
MCHC RBC-ENTMCNC: 33.4 GM/DL — SIGNIFICANT CHANGE UP (ref 32–36)
MCV RBC AUTO: 94.6 FL — SIGNIFICANT CHANGE UP (ref 80–100)
NT-PROBNP SERPL-SCNC: 426 PG/ML — HIGH (ref 0–300)
PLATELET # BLD AUTO: 277 K/UL — SIGNIFICANT CHANGE UP (ref 150–400)
POTASSIUM SERPL-MCNC: 4.1 MMOL/L — SIGNIFICANT CHANGE UP (ref 3.5–5.3)
POTASSIUM SERPL-SCNC: 4.1 MMOL/L — SIGNIFICANT CHANGE UP (ref 3.5–5.3)
RBC # BLD: 4.66 M/UL — SIGNIFICANT CHANGE UP (ref 4.2–5.8)
RBC # FLD: 13.1 % — SIGNIFICANT CHANGE UP (ref 10.3–14.5)
SODIUM SERPL-SCNC: 138 MMOL/L — SIGNIFICANT CHANGE UP (ref 135–145)
WBC # BLD: 7 K/UL — SIGNIFICANT CHANGE UP (ref 3.8–10.5)
WBC # FLD AUTO: 7 K/UL — SIGNIFICANT CHANGE UP (ref 3.8–10.5)

## 2017-04-21 PROCEDURE — 93458 L HRT ARTERY/VENTRICLE ANGIO: CPT

## 2017-04-21 PROCEDURE — C1769: CPT

## 2017-04-21 PROCEDURE — 85027 COMPLETE CBC AUTOMATED: CPT

## 2017-04-21 PROCEDURE — C8929: CPT

## 2017-04-21 PROCEDURE — 80048 BASIC METABOLIC PNL TOTAL CA: CPT

## 2017-04-21 PROCEDURE — 83880 ASSAY OF NATRIURETIC PEPTIDE: CPT

## 2017-04-21 PROCEDURE — C1894: CPT

## 2017-04-21 PROCEDURE — C1887: CPT

## 2017-04-21 PROCEDURE — 93005 ELECTROCARDIOGRAM TRACING: CPT

## 2017-04-21 RX ORDER — CARVEDILOL PHOSPHATE 80 MG/1
1 CAPSULE, EXTENDED RELEASE ORAL
Qty: 60 | Refills: 0 | OUTPATIENT
Start: 2017-04-21 | End: 2017-05-21

## 2017-04-21 RX ORDER — CARVEDILOL PHOSPHATE 80 MG/1
1 CAPSULE, EXTENDED RELEASE ORAL
Qty: 0 | Refills: 0 | COMMUNITY
Start: 2017-04-21

## 2017-04-21 RX ORDER — ASPIRIN/CALCIUM CARB/MAGNESIUM 324 MG
1 TABLET ORAL
Qty: 0 | Refills: 0 | COMMUNITY
Start: 2017-04-21

## 2017-04-21 RX ORDER — ASPIRIN/CALCIUM CARB/MAGNESIUM 324 MG
81 TABLET ORAL DAILY
Qty: 0 | Refills: 0 | Status: DISCONTINUED | OUTPATIENT
Start: 2017-04-21 | End: 2017-04-21

## 2017-04-21 RX ORDER — CARVEDILOL PHOSPHATE 80 MG/1
12.5 CAPSULE, EXTENDED RELEASE ORAL EVERY 12 HOURS
Qty: 0 | Refills: 0 | Status: DISCONTINUED | OUTPATIENT
Start: 2017-04-21 | End: 2017-04-21

## 2017-04-21 RX ORDER — IPRATROPIUM/ALBUTEROL SULFATE 18-103MCG
3 AEROSOL WITH ADAPTER (GRAM) INHALATION
Qty: 0 | Refills: 0 | COMMUNITY

## 2017-04-21 RX ORDER — ALBUTEROL 90 UG/1
2 AEROSOL, METERED ORAL
Qty: 1 | Refills: 0 | OUTPATIENT
Start: 2017-04-21

## 2017-04-21 RX ORDER — FUROSEMIDE 40 MG
1 TABLET ORAL
Qty: 30 | Refills: 0 | OUTPATIENT
Start: 2017-04-21 | End: 2017-05-21

## 2017-04-21 RX ORDER — DIGOXIN 250 MCG
1 TABLET ORAL
Qty: 30 | Refills: 0 | OUTPATIENT
Start: 2017-04-21 | End: 2017-05-21

## 2017-04-21 RX ADMIN — HEPARIN SODIUM 5000 UNIT(S): 5000 INJECTION INTRAVENOUS; SUBCUTANEOUS at 05:42

## 2017-04-21 RX ADMIN — LISINOPRIL 20 MILLIGRAM(S): 2.5 TABLET ORAL at 05:42

## 2017-04-21 RX ADMIN — Medication 40 MILLIGRAM(S): at 05:42

## 2017-04-21 RX ADMIN — CARVEDILOL PHOSPHATE 25 MILLIGRAM(S): 80 CAPSULE, EXTENDED RELEASE ORAL at 08:58

## 2017-04-21 RX ADMIN — Medication 81 MILLIGRAM(S): at 11:02

## 2017-04-21 NOTE — DIETITIAN INITIAL EVALUATION ADULT. - ORAL INTAKE PTA
good/Pt reports eating 3 meals/day (small breakfast and dinner, and a big lunch). Breakfast is cheerios with skim milk. Lunch is from cafeteria at work- protein (usually chicken) with mashed potatoes and a vegetable (carrots, corn, broccoli). Dinner can be pasta with tomato sauce or grilled chicken or burger.

## 2017-04-21 NOTE — DIETITIAN INITIAL EVALUATION ADULT. - NS AS NUTRI INTERV MEALS SNACK
Encourage pt to maintain good PO intake. Provide food preferences within therapeutic diet when requested.

## 2017-04-21 NOTE — DIETITIAN INITIAL EVALUATION ADULT. - ADHERENCE
low sodium. Pt states meals prepared at home are very low sodium and he does not add salt to food. However, states he is aware the food at work is not low in sodium. Pt states he also tries to avoid the fried options at work./fair low sodium. Pt states meals prepared at home are very low sodium and he does not add salt to food. However, states he is aware the food at work is not low in sodium. Pt states he also tries to avoid the fried options at work. Pt receptive to instruction on nutrition therapy for CHF. Discussed strategies to prepare meals at home that are low in sodium and he can bring to work. Pt able to teach-back diet education and willing to make identified changes./fair

## 2017-04-21 NOTE — DIETITIAN INITIAL EVALUATION ADULT. - NS AS NUTRI INTERV ED CONTENT
Provided verbal and written instruction on nutrition therapy for CHF. Discussed low sodium diet, monitoring fluid intake and daily weight. Discussed weight gain parameters and when to contact physician.

## 2017-04-21 NOTE — DIETITIAN INITIAL EVALUATION ADULT. - ENERGY NEEDS
Ht:5ft7in, Wt:211.6pounds,   BMI:33.2 (question accuracy),   IBW:148pounds (+/-10%), 143%IBW  Pt with 1+candelario ankle edema, no pressure ulcers noted.   Pertinent Information: Pt s/p cardiac catheterization, with acute on chronic systolic heart failure as per chart.

## 2017-04-21 NOTE — DIETITIAN INITIAL EVALUATION ADULT. - OTHER INFO
Pt reports usual weight of about 200pounds with no changes PTA. However, noted admission weight 211.6pounds. Question pt with fluid retention PTA as pt adm with acute on chronic HF and edema. Pt reports usual weight of about 200pounds with no changes PTA. However, noted admission weight 211.6pounds. Question pt with fluid retention PTA as pt adm with acute on chronic HF and edema. No known food allergies or intolerances reported. Pt endorses micronutrient supplementation PTA with a senior multivitamin. Currently, pt with a good appetite and eating 100% of meals. Denies GI distress with last BM today. No difficulty chewing/swallowing.

## 2017-04-25 DIAGNOSIS — I34.0 NONRHEUMATIC MITRAL (VALVE) INSUFFICIENCY: ICD-10-CM

## 2017-04-25 DIAGNOSIS — I50.23 ACUTE ON CHRONIC SYSTOLIC (CONGESTIVE) HEART FAILURE: ICD-10-CM

## 2017-04-25 DIAGNOSIS — Z79.51 LONG TERM (CURRENT) USE OF INHALED STEROIDS: ICD-10-CM

## 2017-04-25 DIAGNOSIS — I13.0 HYPERTENSIVE HEART AND CHRONIC KIDNEY DISEASE WITH HEART FAILURE AND STAGE 1 THROUGH STAGE 4 CHRONIC KIDNEY DISEASE, OR UNSPECIFIED CHRONIC KIDNEY DISEASE: ICD-10-CM

## 2017-04-25 DIAGNOSIS — E78.5 HYPERLIPIDEMIA, UNSPECIFIED: ICD-10-CM

## 2017-04-25 DIAGNOSIS — I36.1 NONRHEUMATIC TRICUSPID (VALVE) INSUFFICIENCY: ICD-10-CM

## 2017-04-25 DIAGNOSIS — I45.10 UNSPECIFIED RIGHT BUNDLE-BRANCH BLOCK: ICD-10-CM

## 2017-04-25 DIAGNOSIS — I27.2 OTHER SECONDARY PULMONARY HYPERTENSION: ICD-10-CM

## 2017-04-25 DIAGNOSIS — E66.9 OBESITY, UNSPECIFIED: ICD-10-CM

## 2017-04-25 DIAGNOSIS — N18.3 CHRONIC KIDNEY DISEASE, STAGE 3 (MODERATE): ICD-10-CM

## 2017-04-25 DIAGNOSIS — Z86.73 PERSONAL HISTORY OF TRANSIENT ISCHEMIC ATTACK (TIA), AND CEREBRAL INFARCTION WITHOUT RESIDUAL DEFICITS: ICD-10-CM

## 2017-12-14 RX ORDER — FUROSEMIDE 40 MG
1 TABLET ORAL
Qty: 0 | Refills: 0 | COMMUNITY

## 2017-12-14 RX ORDER — SPIRONOLACTONE 25 MG/1
0 TABLET, FILM COATED ORAL
Qty: 0 | Refills: 0 | COMMUNITY

## 2019-10-18 NOTE — ED ADULT NURSE NOTE - PAIN RATING/NUMBER SCALE (0-10): ACTIVITY
Patient is here with concerns that he has had a urinary tract infection that he first noticed early this week seem to improve with using what sounds like was most likely Pyridium. He has not had fever during this time. He has some decrease in force of urinary stream.  He has had recurring urinary tract infections in the past though none of these have been recent. He is followed by Dr. Patience King in urology but wishes transfer to [facility] based practice. He reports that was recommended to : \"have my penis removed and empty my bladder through a tube in my perineum\"(patients exact words). No flank pain. He states does not feel like infections that involve his kidney in the past.  Has had no fevers or chills. Is actually been quite sometime since he has been on any antibiotics possibly years for similar. uncircumcised but \"had foreskin split\"    The history is provided by the patient and the spouse. Dysuria    This is a new problem. The current episode started more than 2 days ago. The problem occurs every urination. The problem has been gradually worsening. The pain is moderate. Pertinent negatives include no chills, no frequency and no hematuria. He has tried nothing for the symptoms. His past medical history is significant for recurrent UTIs.         Past Medical History:   Diagnosis Date    Anxiety     BPH (benign prostatic hyperplasia)     Bronchitis age 23-23    hx of wheat allergy     Chronic pain     Depression     Deviated septum     Frequent UTI     followed by Dr Patience Kign ([facility])    Hyperlipidemia     Migraines     Nervousness     pt takes propranolol daily- constantly moving and tapping feet    Obesity     Osteoarthritis     PTSD (post-traumatic stress disorder)     Seasonal allergic rhinitis        Past Surgical History:   Procedure Laterality Date    HX CHOLECYSTECTOMY  01/03/2017    HX COLECTOMY  1989    perforated colon    HX COLONOSCOPY  12/05/2017    HX GASTRIC BYPASS  2012    HX GI 1968    gastric ulcer    HX HERNIA REPAIR Bilateral 1965    inguinal hernia    HX HERNIA REPAIR  1998    hiatal hernia    HX HERNIA REPAIR  09/29/2016    ventral hernia; removal of infected mesh    HX MYRINGOTOMY Bilateral as child    ear tubes x 3    HX TONSILLECTOMY  as child    CO CYSTOURETHROSCOPY  06/25/2018         Family History:   Problem Relation Age of Onset    Other Mother         CHF       Social History     Socioeconomic History    Marital status:      Spouse name: Not on file    Number of children: Not on file    Years of education: Not on file    Highest education level: Not on file   Occupational History    Not on file   Social Needs    Financial resource strain: Not on file    Food insecurity:     Worry: Not on file     Inability: Not on file    Transportation needs:     Medical: Not on file     Non-medical: Not on file   Tobacco Use    Smoking status: Never Smoker    Smokeless tobacco: Never Used   Substance and Sexual Activity    Alcohol use: No    Drug use: No    Sexual activity: Not on file   Lifestyle    Physical activity:     Days per week: Not on file     Minutes per session: Not on file    Stress: Not on file   Relationships    Social connections:     Talks on phone: Not on file     Gets together: Not on file     Attends Mandaen service: Not on file     Active member of club or organization: Not on file     Attends meetings of clubs or organizations: Not on file     Relationship status: Not on file    Intimate partner violence:     Fear of current or ex partner: Not on file     Emotionally abused: Not on file     Physically abused: Not on file     Forced sexual activity: Not on file   Other Topics Concern    Not on file   Social History Narrative    Not on file         ALLERGIES: Iodine    Review of Systems   Constitutional: Negative for chills. Gastrointestinal: Negative. Genitourinary: Positive for dysuria. Negative for frequency and hematuria. Musculoskeletal: Negative. Psychiatric/Behavioral: Negative. All other systems reviewed and are negative. Vitals:    10/18/19 1419   BP: 111/66   Pulse: 86   Resp: 17   Temp: 98.1 °F (36.7 °C)   SpO2: 95%   Weight: 109.8 kg (242 lb)   Height: 5' 8\" (1.727 m)            Physical Exam   Constitutional: He appears well-developed and well-nourished. No distress. Older than age but pleasant/ non-toxic   HENT:   Head: Atraumatic. Eyes: No scleral icterus. Neck: Neck supple. Cardiovascular: Normal rate. Pulmonary/Chest: Effort normal. No respiratory distress. Abdominal: Soft. Neurological: He is alert. Skin: Skin is warm and dry. Psychiatric: Thought content normal.   Nursing note and vitals reviewed. MDM  Number of Diagnoses or Management Options  Urinary tract infection without hematuria, site unspecified:   Diagnosis management comments: Uti/ lower.  Will refer to urology as well as back to PMD       Amount and/or Complexity of Data Reviewed  Clinical lab tests: reviewed and ordered  Decide to obtain previous medical records or to obtain history from someone other than the patient: yes  Obtain history from someone other than the patient: yes    Risk of Complications, Morbidity, and/or Mortality  Presenting problems: moderate  Management options: moderate    Patient Progress  Patient progress: stable         Procedures 0

## 2021-03-08 NOTE — DISCHARGE NOTE ADULT - MEDICATION SUMMARY - MEDICATIONS TO TAKE
Behavioral Discharge Planning and Instructions    Summary: Steven Carter is a 33 year old single male who was brought to the [facility] ED by EMS for evaluation of possible catatonia and decompensated schizoaffective disorder.     Main Diagnosis:   Schizoaffective disorder- depressed type (by history)  R/o substance induced psychosis  ADHD, unspecified  History of TBI    Health Care Follow-up:     NUMBERS TO CALL IN CRISIS    National Suicide Prevention Lifeline ([facility])  1-995.835.7386    Crisis Text Line ([place])  Text  HOME  to 158064    Mental Health Crisis Line ([place], [place])  112.575.5879    [facility] Mobile Crisis ([place], [place])   [phone]      If you are feeling very unsafe, call 911 or bring yourself to the Emergency Room        Counseling in [place]:  Basil Mitchell           [phone]  [facility]    Karen Stern, [facility]      [phone]  [facility]     [facility] (young kids)    [phone]   [facility] (older kids & adults)  [phone]   Magnus Smith [facility]      [phone]  [facility]       [phone]   Rey Castelan MS, LP   Dayna Machado MA, [facility]   Misty Manley MS psychotherapist   Windy Locke MS, [facility]   Chantel Shah, [facility], counselor   Emelia Castelan [facility], counselor  Danyell        [phone]  Cornel Casillas, counseling  Dr Allyssa Tejeda, psychiatry  Lakesha Orona, counseling  Primo Newsome, counseling  Jana Leung, counseling  Ary Palmer, psychiatry   [facility]       [phone]   Nikole Mchugh MA, LMFT, RPFS   Ita Shaffer MSW, [facility]          [phone]  [facility]      [phone]   Emelia Jung MS, [facility]          [phone]        Eliza Ballard MSW, [facility] , C-MI   Hina Lagunas MSW, [facility]                                                    Baldo Alvarado [facility]      Angely Plascencia MS, [facility]   Carleen Garcia   [facility]                [phone]      Saida Bales PsyD     Josseline Kennedy PsyD, owner      Yaneli Wong PsyD    Liliam Handy MPAS, PA-PAPITO Mercer PhD   Luisa Terrell MSW, LICSW [facility]       [phone]   Esther Browne [facility]   Heladio Villagomez APRN, CNP,     Caesar Avilaer MSW, SW (adolescents)   Jackie Grinnel APRN, CNP (Hib via telehealth; adolescents)   Nissa JALLOH, CNP (Hib via telehealth)   Julio Dias MA, LPC, BCIA (Hib via telehealth)   Skylar Cameron [facility] MSW, SW   Harmony Mack [facility]   Jefry Padilla DNP, APRN, CNP   Patrizia Kahn RN, BSN   Marialuisa Samayoa RN-BC, BSN (Hib via telehealth)  [facility]         [phone]   Cristela Anderson, MSN, [facility]           [phone]   Nuno Singh MA, [facility]   Freda Shelley MS RN [facility] NP   Emelia Birmingham, [facility]         [phone]  [facility]       [phone]   [facility]   Winnie Joe (to be [facility])   Greg Johnson (to be [facility])      Counseling in [place]:  [facility]       [phone]   mental health & CD counseling  Julio Reese       [phone]  [facility]       [phone]  Ellie Banner        [phone]   Nikole Colmenares  [facility]       [employer] [facility] [facility]              [phone]   can do weekends and evenings   DeLcasey Esquivel, MSW, LIC, LCSW, CCTP    Rene Cordova MA, LMFT, [facility]       evenings, weekends  [phone]      Counseling in [place]:  [facility]         [phone]   Cristela Anderson, MSN, [facility]   Xiao Hoover MA, [facility]  [facility]      [phone]  [facility]       [phone]   Jessica Stern [facility]  [facility]       Farida Garcia       [phone]  [place] [id number]     Cristofer Montiel      [phone]  [facility]      Primo Lamar MS Ed, LMFT    [phone]    (kids) denotes providers who also see kids  Attend all scheduled appointments with your outpatient providers. Call at least 24 hours in advance if you need to reschedule an appointment to ensure continued access to your outpatient providers.     Major Treatments, Procedures and Findings:  You were provided with: a psychiatric assessment, assessed for medical stability, medication evaluation and/or management, group therapy, family therapy, individual therapy, CD evaluation/assessment, milieu management and medical interventions    Symptoms to Report: feeling more aggressive, increased confusion, losing more sleep, mood getting worse or thoughts of suicide    Early warning signs can include: increased depression or anxiety sleep disturbances increased thoughts or behaviors of suicide or self-harm  increased unusual thinking, such as paranoia or hearing voices      Resources:   Crisis Intervention: 364.757.7715 or 288-914-2421 (TTY: 804.694.1828).  Call anytime for help.  National [place] on Mental Illness (www.[place].nichelle.org): 600.729.8294 or 424-793-2956.  Alcoholics Anonymous (www.alcoholics-anonymous.org): Check your phone book for your local chapter.  Suicide Awareness Voices of Education (SAVE) (www.save.org): 544-430-BFGX (1683)  National Suicide Prevention Line (www.mentalhealthmn.org): 296-055-AGCQ (4253)  Mental Health Consumer/Survivor Network of [place] (www.mhcsn.net): 571.438.2226 or 035-770-5490  Mental Health Association [place] (www.mentalhealth.org): 208.902.2673 or 408-518-9043  Self- Management and Recovery Training., SMART-- Toll free: 676.260.1659  www.[employer] IncrecAudyssey.org  [facility] Crisis Response [phone]  [facility] Crisis Response 530-982-9283  [facility] Crisis Response 114-787-4688  [facility] Crisis (COPE) Response - Adult [phone]  [facility] Crisis Response - Adult [phone]  [facility] Rapid Response  "[phone]  Text 4 Life: txt \"LIFE\" to 06445 for immediate support and crisis intervention  Crisis text line: Text \"[place]\" to 407788. Free, confidential, 24/7.  Crisis Intervention: 587.206.5704 or 507-977-3469. Call anytime for help.   [facility] Mental Health Crisis Team - Child: [phone]  [facility] Crisis Response Team - Child: [phone]  [facility] Mental Health Crisis: 7-404-832-2579   [facility] Mental Health Crisis Team:  [phone]  LyndonSilke, Omar, and [facility] Crisis Response Team (CRT):  [phone] or [phone]     General Medication Instructions:   See your medication sheet(s) for instructions.   Take all medicines as directed.  Make no changes unless your doctor suggests them.   Go to all your doctor visits.  Be sure to have all your required lab tests. This way, your medicines can be refilled on time.  Do not use any drugs not prescribed by your doctor.  Avoid alcohol.    Advance Directives:   Scanned document on file with [employer]? No scanned doc  Is document scanned? Pt unable to confirm  Honoring Choices Your Rights Handout: Informed and given  Was more information offered? Pt unable to request    The Treatment team has appreciated the opportunity to work with you. If you have any questions or concerns about your recent admission, you can contact the unit which can receive your call 24 hours a day, 7 days a week. They will be able to get in touch with a Provider if needed. The unit number is [phone] .    [place] Area:  [facility] stabilization \A Chronology of [place] Hospitals\""- [phone]  [facility] Crisis Line: 1-641.613.9184  [facility] Family Peace: 795-9978  Sexual Assault Program [facility]: [phone] or 1-468.102.4103  [place] [facility] Battered Women's [facility]: 1-281.370.6701 Ext: 279       Calls answered Mon-Fri-8:00 am--4:30 pm    [place]:  [facility]ates for Family Peace: " "2-239-711-1572  [facility] [place] - 3-269-223-7740  [facility] first call for help: 1-131-855-3706  [facility]:  (803) 211-4845    [place] Area:  Warm Line: 1-725.678.4629       Calls answered Tuesday--Saturday 4:00 pm--10:00 pm  [facility] Crisis Line - 299.389.2287  [facility] 931-204-3888    [place] Statewide:  [place] Crisis and Referral Services: 9-317-723-0670  National Suicide Prevention Lifeline: 5-979-383-TALK (2595)   - abs4tzmk- Text \"Life\" to 56861  First Call for Help: 2-1-1  PRATIK Helpline- 5-535-HRMY-HELP   Crisis Text Line: Text  [place]  to 538545  " I will START or STAY ON the medications listed below when I get home from the hospital:    enalapril 5 mg oral tablet  -- 1 tab(s) by mouth once a day, home  -- Indication: For Chronic systolic heart failure    digoxin 125 mcg (0.125 mg) oral tablet  -- 1 tab(s) by mouth once a day, home  -- Indication: For rate control    lovastatin 20 mg oral tablet  -- 1 tab(s) by mouth once a day, home  -- Indication: For hld    carvedilol 25 mg oral tablet  -- 1 tab(s) by mouth every 12 hours  -- Indication: For ChF    DuoNeb 0.5 mg-2.5 mg/3 mL inhalation solution  -- 3 milliliter(s) inhaled 4 times a day, As Needed, hospital   -- Indication: For shortness of breath    albuterol CFC free 90 mcg/inh inhalation aerosol  -- 1 puff(s) inhaled every 6 hours, As needed, Shortness of Breath and/or Wheezing, home  -- Indication: For shortness of breath    furosemide 40 mg oral tablet  -- 1 tab(s) by mouth once a day, home  -- Indication: For ChF I will START or STAY ON the medications listed below when I get home from the hospital:    aspirin 81 mg oral tablet, chewable  -- 1 tab(s) by mouth once a day  -- Indication: For CaD    enalapril 5 mg oral tablet  -- 1 tab(s) by mouth once a day, home  -- Indication: For Chronic systolic heart failure    digoxin 125 mcg (0.125 mg) oral tablet  -- 1 tab(s) by mouth once a day, home  -- Indication: For rate control    lovastatin 20 mg oral tablet  -- 1 tab(s) by mouth once a day, home  -- Indication: For hld    carvedilol 12.5 mg oral tablet  -- 1 tab(s) by mouth every 12 hours  -- Indication: For HF    albuterol CFC free 90 mcg/inh inhalation aerosol  -- 1 puff(s) inhaled every 6 hours, As needed, Shortness of Breath and/or Wheezing, home  -- Indication: For shortness of breath    furosemide 40 mg oral tablet  -- 1 tab(s) by mouth once a day, home  -- Indication: For ChF

## 2024-01-21 NOTE — CONSULT NOTE ADULT - PROBLEM SELECTOR PROBLEM 1
Wound care performed per order, dressing dry and intact, tolerated well,  no concerns at this time.   Acute on chronic systolic congestive heart failure

## 2024-11-12 NOTE — PATIENT PROFILE ADULT. - FUNCTIONAL SCREEN CURRENT LEVEL: EATING, MLM
Met with patient at the bedside. Discharge Planning discussed. AVS updated with follow-ups, education, and medication information. Verified/ entered a PCP and pharmacy. New medications and side effects discussed. All questions answered.  Updated nurse on this info. AVS printed and hi-lighted. IV removed previously.      no (0) [place]

## 2025-06-30 NOTE — PROVIDER CONTACT NOTE (CRITICAL VALUE NOTIFICATION) - NAME OF MD/NP/PA/DO NOTIFIED:
Detail Level: Detailed
Quality 226: Preventive Care And Screening: Tobacco Use: Screening And Cessation Intervention: Patient screened for tobacco use and is an ex/non-smoker
Quality 130: Documentation Of Current Medications In The Medical Record: Current Medications Documented
Carrie

## 2025-07-15 NOTE — ED ADULT NURSE NOTE - NS ED NURSE LEVEL OF CONSCIOUSNESS AFFECT
Family Practice Note    HISTORY OF PRESENT ILLNESS:  Patient presents for evaluation of a rash involving the bilateral foot and groin.  Rash started over the last three days ago.  Lesions are brown, and flat in texture.  Rash has not changed over time.  Rash is pruritic.  Associated symptoms:  none. Patient denies:  pain or weeping. Patient has not had contacts with similar rash.  Patient has not had new exposures (soaps, lotions, laundry detergents, foods, medications, plants, insects or animals).    For symptom relief he has tried over the counter clotrimazole with little relief. He also uses a brush and continues to have improved hygiene.  He does feel he has been galina more with the heat and has to take his shoes off because his feet are so warm but does walk around in his bare feet.       He called yesterday for rx, advised to come to office, voices dissatisfaction regarding this    ALLERGIES:   Allergen Reactions    Aspirin ANAPHYLAXIS    Meloxicam Other (See Comments)     Current Outpatient Medications on File Prior to Visit   Medication Sig Dispense Refill    fluticasone (FLONASE) 50 MCG/ACT nasal spray Spray 2 sprays in each nostril daily. 3 each 1    losartan (COZAAR) 25 MG tablet Take 1 tablet by mouth daily. 90 tablet 3    budesonide-formoterol (SYMBICORT) 160-4.5 MCG/ACT inhaler Inhale 2 puffs into the lungs in the morning and 2 puffs in the evening. Rinse mouth after use. 3 each 1    EVOLocumab (REPATHA SURECLICK) 140 MG/ML injection Inject 1 mL into the skin every 14 days. 6 mL 1    VITAMIN D, CHOLECALCIFEROL, PO Take 125 mcg by mouth daily.      Multiple Vitamins-Minerals (Multivitamin Men 50+) Tab Take 1 tablet by mouth daily.      Ascorbic Acid (vitamin C) 1000 MG tablet Take 1,000 mg by mouth daily.      MAGNESIUM OXIDE PO       cyanocobalamin (Vitamin B-12) 250 MCG tablet Take 250 mcg by mouth daily.      ZINC SULFATE PO       metoPROLOL succinate (TOPROL-XL) 50 MG 24 hr tablet Take 1 tablet  by mouth every evening. 90 tablet 3    albuterol 108 (90 Base) MCG/ACT inhaler Inhale 2 puffs into the lungs every 4 hours as needed for Shortness of Breath or Wheezing. 1 each 1     No current facility-administered medications on file prior to visit.     Patient Active Problem List   Diagnosis    Current smoker    Frequent PVCs    Orthopnea    Sinus congestion    Pulmonary emphysema  (CMD)    Hyperlipidemia LDL goal <70    Chronic combined systolic and diastolic congestive heart failure  (CMD)    Primary hypertension    PVC (premature ventricular contraction)    Myocardiopathy  (CMD)    Myalgia due to statin    Medicare annual wellness visit, subsequent     Current Outpatient Medications   Medication Sig Dispense Refill    ketoconazole (NIZORAL) 2 % cream Apply 1 Application topically daily. For up to 6 weeks to affected feet 15 g 0    fluticasone (FLONASE) 50 MCG/ACT nasal spray Spray 2 sprays in each nostril daily. 3 each 1    losartan (COZAAR) 25 MG tablet Take 1 tablet by mouth daily. 90 tablet 3    budesonide-formoterol (SYMBICORT) 160-4.5 MCG/ACT inhaler Inhale 2 puffs into the lungs in the morning and 2 puffs in the evening. Rinse mouth after use. 3 each 1    EVOLocumab (REPATHA SURECLICK) 140 MG/ML injection Inject 1 mL into the skin every 14 days. 6 mL 1    VITAMIN D, CHOLECALCIFEROL, PO Take 125 mcg by mouth daily.      Multiple Vitamins-Minerals (Multivitamin Men 50+) Tab Take 1 tablet by mouth daily.      Ascorbic Acid (vitamin C) 1000 MG tablet Take 1,000 mg by mouth daily.      MAGNESIUM OXIDE PO       cyanocobalamin (Vitamin B-12) 250 MCG tablet Take 250 mcg by mouth daily.      ZINC SULFATE PO       metoPROLOL succinate (TOPROL-XL) 50 MG 24 hr tablet Take 1 tablet by mouth every evening. 90 tablet 3    albuterol 108 (90 Base) MCG/ACT inhaler Inhale 2 puffs into the lungs every 4 hours as needed for Shortness of Breath or Wheezing. 1 each 1     No current facility-administered medications for this  visit.     ALLERGIES:   Allergen Reactions    Aspirin ANAPHYLAXIS    Meloxicam Other (See Comments)       REVIEW OF SYSTEMS:   Constitutional:  No weight change.  No significant fatigue.PER HPI  Respiratory:  No cough.    Cardiovascular:  No chest pain.    SKIN: rash to groin and bilateral feet. PER HPI    PHYSICAL EXAMINATION:  Visit Vitals  BP (!) 147/68 (BP Location: LUE - Left upper extremity, Patient Position: Sitting, Cuff Size: Regular)   Pulse 73   Temp 97.6 °F (36.4 °C) (Temporal)   Ht 5' 4\" (1.626 m)   Wt 76.2 kg (168 lb)   BMI 28.84 kg/m²     General Appearance:  Alert and oriented x3.   Skin:  diffuse, tan, scalymoderately itching, both feet   Lungs:  CTA  CV:RRR    ASSESSMENT:  1. Tinea pedis of both feet      Orders Placed This Encounter    ketoconazole (NIZORAL) 2 % cream     Patient Instructions   Patient instructions  Maintain good hygiene and washing hands after touching groin or feet.   Encourage to keep his feet dry and maintain a cool environment.  Rx sent    Pt agreeable to care plan       Opal Horne, CNP           [place]